# Patient Record
Sex: FEMALE | Race: WHITE | Employment: FULL TIME | URBAN - METROPOLITAN AREA
[De-identification: names, ages, dates, MRNs, and addresses within clinical notes are randomized per-mention and may not be internally consistent; named-entity substitution may affect disease eponyms.]

---

## 2017-04-18 ENCOUNTER — OFFICE VISIT (OUTPATIENT)
Dept: INTERNAL MEDICINE CLINIC | Age: 32
End: 2017-04-18

## 2017-04-18 ENCOUNTER — HOSPITAL ENCOUNTER (OUTPATIENT)
Dept: OTHER | Age: 32
Discharge: HOME | End: 2017-04-18
Attending: INTERNAL MEDICINE | Admitting: INTERNAL MEDICINE

## 2017-04-18 VITALS
TEMPERATURE: 99 F | WEIGHT: 221 LBS | HEIGHT: 64 IN | RESPIRATION RATE: 14 BRPM | HEART RATE: 80 BPM | DIASTOLIC BLOOD PRESSURE: 80 MMHG | SYSTOLIC BLOOD PRESSURE: 110 MMHG | BODY MASS INDEX: 37.73 KG/M2

## 2017-04-18 DIAGNOSIS — B34.9 VIRAL SYNDROME: Primary | ICD-10-CM

## 2017-04-18 LAB
RAPID INFLUENZA  B AGN: NEGATIVE
RAPID INFLUENZA A AGN: NEGATIVE

## 2017-04-18 PROCEDURE — 99213 OFFICE O/P EST LOW 20 MIN: CPT | Performed by: INTERNAL MEDICINE

## 2017-04-18 ASSESSMENT — ENCOUNTER SYMPTOMS
COUGH: 1
SORE THROAT: 1

## 2019-01-18 ENCOUNTER — TELEPHONE (OUTPATIENT)
Dept: INTERNAL MEDICINE CLINIC | Age: 34
End: 2019-01-18

## 2019-01-18 DIAGNOSIS — Z00.00 ROUTINE GENERAL MEDICAL EXAMINATION AT A HEALTH CARE FACILITY: Primary | ICD-10-CM

## 2019-03-02 ENCOUNTER — HOSPITAL ENCOUNTER (OUTPATIENT)
Age: 34
Discharge: HOME | End: 2019-03-02
Payer: COMMERCIAL

## 2019-03-02 DIAGNOSIS — Z00.00 ROUTINE GENERAL MEDICAL EXAMINATION AT A HEALTH CARE FACILITY: ICD-10-CM

## 2019-03-02 LAB
A/G RATIO: 1.3 (ref 1.1–2.2)
ALBUMIN SERPL-MCNC: 3.9 G/DL (ref 3.4–5)
ALP BLD-CCNC: 57 U/L (ref 40–129)
ALT SERPL-CCNC: 22 U/L (ref 10–40)
ANION GAP SERPL CALCULATED.3IONS-SCNC: 13 MMOL/L (ref 3–16)
AST SERPL-CCNC: 17 U/L (ref 15–37)
BASOPHILS ABSOLUTE: 0 K/UL (ref 0–0.2)
BASOPHILS RELATIVE PERCENT: 0.4 %
BILIRUB SERPL-MCNC: <0.2 MG/DL (ref 0–1)
BUN BLDV-MCNC: 11 MG/DL (ref 7–20)
CALCIUM SERPL-MCNC: 8.8 MG/DL (ref 8.3–10.6)
CHLORIDE BLD-SCNC: 102 MMOL/L (ref 99–110)
CO2: 25 MMOL/L (ref 21–32)
CREAT SERPL-MCNC: 0.6 MG/DL (ref 0.6–1.1)
EOSINOPHILS ABSOLUTE: 0.1 K/UL (ref 0–0.6)
EOSINOPHILS RELATIVE PERCENT: 1.5 %
GFR AFRICAN AMERICAN: >60
GFR NON-AFRICAN AMERICAN: >60
GLOBULIN: 2.9 G/DL
GLUCOSE BLD-MCNC: 107 MG/DL (ref 70–99)
HCT VFR BLD CALC: 39.5 % (ref 36–48)
HEMOGLOBIN: 13.1 G/DL (ref 12–16)
LYMPHOCYTES ABSOLUTE: 1.5 K/UL (ref 1–5.1)
LYMPHOCYTES RELATIVE PERCENT: 31.8 %
MCH RBC QN AUTO: 30.6 PG (ref 26–34)
MCHC RBC AUTO-ENTMCNC: 33.1 G/DL (ref 31–36)
MCV RBC AUTO: 92.6 FL (ref 80–100)
MONOCYTES ABSOLUTE: 0.4 K/UL (ref 0–1.3)
MONOCYTES RELATIVE PERCENT: 7.5 %
NEUTROPHILS ABSOLUTE: 2.8 K/UL (ref 1.7–7.7)
NEUTROPHILS RELATIVE PERCENT: 58.8 %
PDW BLD-RTO: 13.5 % (ref 12.4–15.4)
PLATELET # BLD: 287 K/UL (ref 135–450)
PMV BLD AUTO: 8.2 FL (ref 5–10.5)
POTASSIUM SERPL-SCNC: 4.3 MMOL/L (ref 3.5–5.1)
RBC # BLD: 4.27 M/UL (ref 4–5.2)
SODIUM BLD-SCNC: 140 MMOL/L (ref 136–145)
TOTAL PROTEIN: 6.8 G/DL (ref 6.4–8.2)
TSH SERPL DL<=0.05 MIU/L-ACNC: 1.69 UIU/ML (ref 0.27–4.2)
URIC ACID, SERUM: 5 MG/DL (ref 2.6–6)
WBC # BLD: 4.8 K/UL (ref 4–11)

## 2019-03-02 PROCEDURE — 85025 COMPLETE CBC W/AUTO DIFF WBC: CPT

## 2019-03-02 PROCEDURE — 36415 COLL VENOUS BLD VENIPUNCTURE: CPT

## 2019-03-02 PROCEDURE — 80053 COMPREHEN METABOLIC PANEL: CPT

## 2019-03-02 PROCEDURE — 84550 ASSAY OF BLOOD/URIC ACID: CPT

## 2019-03-02 PROCEDURE — 84443 ASSAY THYROID STIM HORMONE: CPT

## 2019-03-07 ENCOUNTER — OFFICE VISIT (OUTPATIENT)
Dept: INTERNAL MEDICINE CLINIC | Age: 34
End: 2019-03-07

## 2019-03-07 VITALS
DIASTOLIC BLOOD PRESSURE: 89 MMHG | BODY MASS INDEX: 40.97 KG/M2 | HEIGHT: 64 IN | RESPIRATION RATE: 18 BRPM | SYSTOLIC BLOOD PRESSURE: 130 MMHG | HEART RATE: 70 BPM | WEIGHT: 240 LBS

## 2019-03-07 DIAGNOSIS — Z00.00 ROUTINE GENERAL MEDICAL EXAMINATION AT A HEALTH CARE FACILITY: Primary | ICD-10-CM

## 2019-03-07 DIAGNOSIS — R73.01 ELEVATED FASTING BLOOD SUGAR: ICD-10-CM

## 2019-03-07 PROCEDURE — 99395 PREV VISIT EST AGE 18-39: CPT | Performed by: INTERNAL MEDICINE

## 2019-03-07 ASSESSMENT — ENCOUNTER SYMPTOMS
SHORTNESS OF BREATH: 0
STRIDOR: 0
PHOTOPHOBIA: 0
DIARRHEA: 0
CONSTIPATION: 0
ABDOMINAL PAIN: 0
TROUBLE SWALLOWING: 0
NAUSEA: 0
EYE PAIN: 0
COLOR CHANGE: 0
CHEST TIGHTNESS: 0
COUGH: 0
SORE THROAT: 0

## 2019-03-07 ASSESSMENT — PATIENT HEALTH QUESTIONNAIRE - PHQ9
SUM OF ALL RESPONSES TO PHQ QUESTIONS 1-9: 0
SUM OF ALL RESPONSES TO PHQ9 QUESTIONS 1 & 2: 0
1. LITTLE INTEREST OR PLEASURE IN DOING THINGS: 0
SUM OF ALL RESPONSES TO PHQ QUESTIONS 1-9: 0
2. FEELING DOWN, DEPRESSED OR HOPELESS: 0

## 2019-03-08 DIAGNOSIS — Z00.00 ROUTINE GENERAL MEDICAL EXAMINATION AT A HEALTH CARE FACILITY: ICD-10-CM

## 2019-03-08 LAB
CHOLESTEROL, FASTING: 179 MG/DL (ref 0–199)
ESTIMATED AVERAGE GLUCOSE: 111.2 MG/DL
HBA1C MFR BLD: 5.5 %
HDLC SERPL-MCNC: 28 MG/DL (ref 40–60)
LDL CHOLESTEROL CALCULATED: 132 MG/DL
TRIGLYCERIDE, FASTING: 94 MG/DL (ref 0–150)
VLDLC SERPL CALC-MCNC: 19 MG/DL

## 2020-10-15 ENCOUNTER — TELEPHONE (OUTPATIENT)
Dept: INTERNAL MEDICINE CLINIC | Age: 35
End: 2020-10-15

## 2020-10-15 NOTE — TELEPHONE ENCOUNTER
----- Message from Jed Montenegro MD sent at 10/15/2020  4:17 PM EDT -----  Contact: Patient [phone]  Order test via [employer] through  ----- Message -----  From: Ralf Hollis  Sent: 10/15/2020   3:28 PM EDT  To: Jed Montenegro MD    Patient was exposed to covid at her place of employment. She went to an urgent care and had the rapid test done, which came back positive. She is leery about the rapid test and is wanting to know if you can order the swab covid test?  Please advise.

## 2020-10-16 ENCOUNTER — OFFICE VISIT (OUTPATIENT)
Dept: PRIMARY CARE CLINIC | Age: 35
End: 2020-10-16
Payer: COMMERCIAL

## 2020-10-16 PROCEDURE — G8421 BMI NOT CALCULATED: HCPCS | Performed by: NURSE PRACTITIONER

## 2020-10-16 PROCEDURE — G8428 CUR MEDS NOT DOCUMENT: HCPCS | Performed by: NURSE PRACTITIONER

## 2020-10-16 PROCEDURE — 99211 OFF/OP EST MAY X REQ PHY/QHP: CPT | Performed by: NURSE PRACTITIONER

## 2020-10-16 NOTE — PATIENT INSTRUCTIONS

## 2020-10-18 LAB — SARS-COV-2, NAA: DETECTED

## 2020-10-19 NOTE — RESULT ENCOUNTER NOTE
Spoke with patient, verbalized understanding. Your test came back detected/positive for Covid-19. What happens if I have a positive test?    If you have symptoms:  Isolate until all three of these things are true: 1) your symptoms are better, 2) it has been 10 days since you first felt sick, and 3) you have had no fever for at least 24 hours without using medicine that lowers fever. Drink plenty of fluids and eat when you can. You may take medicine for pain or fever if you need to. Rest as much as you can. If you do not have symptoms:  Stay home for 10 days after the date you were tested. If you develop symptoms during those 10 days, stay home until all three of these things are true: 1) your symptoms are better, 2) it has been 10 days since you first felt sick, and 3) you have had no fever for at least 24 hours without using medicine that lowers fever. Follow care instructions from your doctor or other healthcare provider. Seek emergency medical care immediately if you have trouble breathing, persistent pain or pressure in the chest, new confusion, inability to wake or stay awake, or bluish lips or face. Someone from the health department (case investigator or contact tracer) may reach out to you to check on your health and ask about other people you have been around or where you've spent time while you may have been able to spread COVID-19 to others. This person's role is strictly to map the virus to help identify people who may have been exposed to the virus and prevent its spread. The local health department will also provide guidance on how to stay safely at home to avoid spreading illness.

## 2021-01-04 ENCOUNTER — TELEPHONE (OUTPATIENT)
Dept: INTERNAL MEDICINE CLINIC | Age: 36
End: 2021-01-04

## 2021-01-04 DIAGNOSIS — R50.9 FEBRILE ILLNESS: Primary | ICD-10-CM

## 2021-01-04 NOTE — TELEPHONE ENCOUNTER
----- Message from Roberta Figueroa sent at 1/4/2021  8:08 AM EST -----  Contact: Pt- [phone]  Pt called stating that she had Covid back in October but thinks she might have it again. Pt started to have a sore throat last night with her ears hurting and a temperature of 99.0. Pt now has a temperature of 102.9 and has vomited this morning.  Pt would like an order for a Covid test faxed to [facility].       [address] [place] [address] [phone]

## 2021-01-04 NOTE — TELEPHONE ENCOUNTER
----- Message from Naomy Horton MD sent at 1/4/2021  1:29 PM EST -----  Contact: [phone]  Please do  ----- Message -----  From: Caterina Renee  Sent: 1/4/2021  10:24 AM EST  To: Naomy Horton MD    Pt needs an order for a rapid test so she can let her employer know asa send to [facility].

## 2021-01-06 ENCOUNTER — HOSPITAL ENCOUNTER (OUTPATIENT)
Age: 36
Discharge: HOME | End: 2021-01-06
Payer: COMMERCIAL

## 2021-01-06 ENCOUNTER — TELEPHONE (OUTPATIENT)
Dept: INTERNAL MEDICINE CLINIC | Age: 36
End: 2021-01-06

## 2021-01-06 ENCOUNTER — HOSPITAL ENCOUNTER (OUTPATIENT)
Dept: GENERAL RADIOLOGY | Age: 36
Discharge: HOME | End: 2021-01-06
Payer: COMMERCIAL

## 2021-01-06 DIAGNOSIS — R68.89 FLU-LIKE SYMPTOMS: ICD-10-CM

## 2021-01-06 DIAGNOSIS — R50.9 FEVER, UNSPECIFIED FEVER CAUSE: Primary | ICD-10-CM

## 2021-01-06 DIAGNOSIS — R50.9 FEVER, UNSPECIFIED FEVER CAUSE: ICD-10-CM

## 2021-01-06 LAB
RAPID INFLUENZA  B AGN: NEGATIVE
RAPID INFLUENZA A AGN: NEGATIVE

## 2021-01-06 PROCEDURE — 87804 INFLUENZA ASSAY W/OPTIC: CPT

## 2021-01-06 PROCEDURE — 71046 X-RAY EXAM CHEST 2 VIEWS: CPT

## 2021-01-07 ENCOUNTER — TELEPHONE (OUTPATIENT)
Dept: INTERNAL MEDICINE CLINIC | Age: 36
End: 2021-01-07

## 2021-01-07 DIAGNOSIS — J18.9 PNEUMONIA OF BOTH LOWER LOBES DUE TO INFECTIOUS ORGANISM: Primary | ICD-10-CM

## 2021-01-07 RX ORDER — CEFDINIR 300 MG/1
300 CAPSULE ORAL 2 TIMES DAILY
Qty: 14 CAPSULE | Refills: 0 | Status: SHIPPED | OUTPATIENT
Start: 2021-01-07 | End: 2021-01-14

## 2021-01-07 RX ORDER — AZITHROMYCIN 250 MG/1
TABLET, FILM COATED ORAL
Qty: 1 PACKET | Refills: 0 | Status: SHIPPED | OUTPATIENT
Start: 2021-01-07 | End: 2021-06-21 | Stop reason: ALTCHOICE

## 2021-01-07 RX ORDER — BENZONATATE 100 MG/1
100 CAPSULE ORAL 3 TIMES DAILY PRN
Qty: 30 CAPSULE | Refills: 0 | Status: SHIPPED | OUTPATIENT
Start: 2021-01-07 | End: 2021-01-17

## 2021-01-07 NOTE — TELEPHONE ENCOUNTER
----- Message from Rosy Eagle sent at 1/7/2021  2:06 PM EST -----  Contact: Marcella Dugan [phone] option [phone]  Pharmacy wanting to verify that you want patient on [facility] and Waneta Escort?

## 2021-01-07 NOTE — TELEPHONE ENCOUNTER
----- Message from Nicole Zuniga MD sent at 1/7/2021  8:05 AM EST -----  Bilateral lower lobe pneumonia noted  Start on levaquin 500 mg daily x 7 days  Need repeat cxr in 4 weeks    Likely caused from covid with secondary bacterial infection     Tessalon pearls 100 mg tid prn cough #30  zofran 4 mg ODT prn nausea q8hrly     Call her

## 2021-06-18 ENCOUNTER — TELEPHONE (OUTPATIENT)
Dept: INTERNAL MEDICINE CLINIC | Age: 36
End: 2021-06-18

## 2021-06-18 NOTE — TELEPHONE ENCOUNTER
----- Message from Licha Dec sent at 6/18/2021  9:55 AM EDT -----  Contact: [place] Merlin [phone]  Left message for patient to return call. Patient can see Dr Ronald Han Monday at 4:20  ----- Message -----  From: Tonio Gonzalez MD  Sent: 6/18/2021   9:43 AM EDT  To: Shauna Concepcion appt next week  ----- Message -----  From: Glen Sexton  Sent: 6/18/2021   9:30 AM EDT  To: Tonio Gonzalez MD    Patient suspects she has sleep apnea. She would like to be seen and discuss a sleep study with you. Has family history of sleep apnea with mother and sister.  Thank you

## 2021-06-18 NOTE — TELEPHONE ENCOUNTER
----- Message from Vito Delgadillo MD sent at 6/18/2021  9:43 AM EDT -----  Contact: Truong Cuevas [phone]  Make appt next week  ----- Message -----  From: Aliza Kate  Sent: 6/18/2021   9:30 AM EDT  To: Vito Delgadillo MD    Patient suspects she has sleep apnea. She would like to be seen and discuss a sleep study with you. Has family history of sleep apnea with mother and sister.  Thank you

## 2021-06-21 ENCOUNTER — OFFICE VISIT (OUTPATIENT)
Dept: INTERNAL MEDICINE CLINIC | Age: 36
End: 2021-06-21

## 2021-06-21 VITALS
RESPIRATION RATE: 18 BRPM | HEART RATE: 70 BPM | BODY MASS INDEX: 43.19 KG/M2 | WEIGHT: 253 LBS | SYSTOLIC BLOOD PRESSURE: 140 MMHG | HEIGHT: 64 IN | DIASTOLIC BLOOD PRESSURE: 78 MMHG

## 2021-06-21 DIAGNOSIS — Z00.00 ANNUAL PHYSICAL EXAM: ICD-10-CM

## 2021-06-21 DIAGNOSIS — G47.33 OSA (OBSTRUCTIVE SLEEP APNEA): ICD-10-CM

## 2021-06-21 DIAGNOSIS — Z00.00 ANNUAL PHYSICAL EXAM: Primary | ICD-10-CM

## 2021-06-21 DIAGNOSIS — E66.01 CLASS 3 SEVERE OBESITY DUE TO EXCESS CALORIES WITHOUT SERIOUS COMORBIDITY WITH BODY MASS INDEX (BMI) OF 40.0 TO 44.9 IN ADULT (HCC): ICD-10-CM

## 2021-06-21 LAB
BILIRUBIN, POC: NORMAL
BLOOD URINE, POC: NORMAL
CLARITY, POC: NORMAL
COLOR, POC: NORMAL
GLUCOSE URINE, POC: NORMAL
KETONES, POC: NORMAL
LEUKOCYTE EST, POC: NORMAL
NITRITE, POC: NORMAL
PH, POC: NORMAL
PROTEIN, POC: NORMAL
SPECIFIC GRAVITY, POC: NORMAL
UROBILINOGEN, POC: NORMAL

## 2021-06-21 PROCEDURE — 81002 URINALYSIS NONAUTO W/O SCOPE: CPT | Performed by: INTERNAL MEDICINE

## 2021-06-21 PROCEDURE — 99395 PREV VISIT EST AGE 18-39: CPT | Performed by: INTERNAL MEDICINE

## 2021-06-21 ASSESSMENT — ENCOUNTER SYMPTOMS
CHEST TIGHTNESS: 0
DIARRHEA: 0
COLOR CHANGE: 0
EYE PAIN: 0
STRIDOR: 0
ABDOMINAL PAIN: 0
CONSTIPATION: 0
COUGH: 0
TROUBLE SWALLOWING: 0
PHOTOPHOBIA: 0
SHORTNESS OF BREATH: 0
SORE THROAT: 0
NAUSEA: 0

## 2021-06-21 NOTE — PROGRESS NOTES
Subjective:      Patient ID: Gus Kim is a 28 y.o. female. HPI      28 y.o. female here for annual exam       No medical issues except for obesity and recent weight gain   , does not take any meds  No heart or lung issues    Reports excessive snoring and possible apneic spells at night , family got concerned about her sleep. Pt denies any daytime fatigue or excessive sleepiness . Energy levels good  Mother has severe MARITO     Recently had covid infection and recovered      and has 2 kids  Non smoker, no alcohol use    Mother with ovarian cancer, father with hx of DM     Past Medical History:   Diagnosis Date    COVID-19 10/16/2020        Past Surgical History:   Procedure Laterality Date    ABDOMEN SURGERY      age 12 yrs   Leeann Errol ADENOIDECTOMY      APPENDECTOMY       SECTION      x2    TONSILLECTOMY      age   Leeann Errol WISDOM TOOTH EXTRACTION Bilateral     five years ago     Allergies   Allergen Reactions    Ciprofloxacin Swelling    Bactrim [Sulfamethoxazole-Trimethoprim] Swelling and Rash    Sulfa Antibiotics Swelling and Rash       Review of Systems   Constitutional: Negative for activity change, diaphoresis, fever and unexpected weight change. HENT: Negative for congestion, ear discharge, hearing loss, nosebleeds, sore throat, tinnitus and trouble swallowing. Eyes: Negative for photophobia, pain and visual disturbance. Respiratory: Negative for cough, chest tightness, shortness of breath and stridor. Cardiovascular: Negative for chest pain and palpitations. Gastrointestinal: Negative for abdominal pain, constipation, diarrhea and nausea. Endocrine: Negative for cold intolerance, heat intolerance and polyuria. Genitourinary: Negative for dysuria, flank pain, frequency and hematuria. Musculoskeletal: Negative for arthralgias and gait problem. Skin: Negative for color change and rash. Allergic/Immunologic: Negative for immunocompromised state.    Neurological: Negative for tremors, seizures, weakness, numbness and headaches. Psychiatric/Behavioral: Positive for sleep disturbance. Negative for decreased concentration. The patient is not nervous/anxious. All other systems reviewed and are negative. Objective:   Physical Exam     Vitals:    06/21/21 1621   BP: (!) 140/78   Pulse: 70   Resp: 18         General: young female, obese,  Awake, alert and oriented. Appears to be not in any distress  Mucous Membranes:  Pink , anicteric  Short neck   Neck: No JVD, no carotid bruit, no thyromegaly  Chest:  Clear to auscultation bilaterally, no added sounds  Cardiovascular:  RRR S1S2 heard, no murmurs or gallops  Abdomen:  Soft, obese, undistended, non tender, no organomegaly, BS present  Extremities: No edema or cyanosis. Distal pulses well felt  Neurological : grossly normal  Non focal     Lab Results   Component Value Date    WBC 4.8 03/02/2019    HGB 13.1 03/02/2019    HCT 39.5 03/02/2019    MCV 92.6 03/02/2019     03/02/2019     Lab Results   Component Value Date     03/02/2019    K 4.3 03/02/2019     03/02/2019    CO2 25 03/02/2019    BUN 11 03/02/2019    CREATININE 0.6 03/02/2019    GLUCOSE 107 (H) 03/02/2019    CALCIUM 8.8 03/02/2019    PROT 6.8 03/02/2019    LABALBU 3.9 03/02/2019    BILITOT <0.2 03/02/2019    ALKPHOS 57 03/02/2019    AST 17 03/02/2019    ALT 22 03/02/2019    LABGLOM >60 03/02/2019    GFRAA >60 03/02/2019    AGRATIO 1.3 03/02/2019    GLOB 2.9 03/02/2019     Wt Readings from Last 3 Encounters:   06/21/21 253 lb (114.8 kg)   03/07/19 240 lb (108.9 kg)   05/27/17 217 lb (98.4 kg)         Assessment:       Diagnosis Orders   1. Annual physical exam  HEMOGLOBIN A1C    CBC WITH AUTO DIFFERENTIAL    COMPREHENSIVE METABOLIC PANEL    TSH with Reflex    LIPID PANEL    URIC ACID    POCT Urinalysis no Micro    Rashmi Mae MD, Sleep Medicine, [facility]   2. MARITO (obstructive sleep apnea)     3.  Class 3 severe obesity due to excess calories without serious comorbidity with body mass index (BMI) of 40.0 to 44.9 in adult ([facility] [address]  HEMOGLOBIN A1C           Plan:      Annual exam done     Suspected MARITO - obtain sleep study      Elevated BP but no HTN, has h.o gestational HTN - should monitor at work  Low salt diet    Recent labs reviewed  Healthy diet and weight loss discussed    Does see GYN yearly  Had flu vaccine  Had covid vaccine this year                Emiliana Nunez MD

## 2021-06-22 LAB
A/G RATIO: 1.4 (ref 1.1–2.2)
ALBUMIN SERPL-MCNC: 4.3 G/DL (ref 3.4–5)
ALP BLD-CCNC: 70 U/L (ref 40–129)
ALT SERPL-CCNC: 17 U/L (ref 10–40)
ANION GAP SERPL CALCULATED.3IONS-SCNC: 12 MMOL/L (ref 3–16)
AST SERPL-CCNC: 27 U/L (ref 15–37)
BASOPHILS ABSOLUTE: 0.1 K/UL (ref 0–0.2)
BASOPHILS RELATIVE PERCENT: 0.8 %
BILIRUB SERPL-MCNC: <0.2 MG/DL (ref 0–1)
BUN BLDV-MCNC: 11 MG/DL (ref 7–20)
CALCIUM SERPL-MCNC: 9.3 MG/DL (ref 8.3–10.6)
CHLORIDE BLD-SCNC: 104 MMOL/L (ref 99–110)
CHOLESTEROL, TOTAL: 189 MG/DL (ref 0–199)
CO2: 23 MMOL/L (ref 21–32)
CREAT SERPL-MCNC: 0.7 MG/DL (ref 0.6–1.1)
EOSINOPHILS ABSOLUTE: 0.1 K/UL (ref 0–0.6)
EOSINOPHILS RELATIVE PERCENT: 1 %
ESTIMATED AVERAGE GLUCOSE: 108.3 MG/DL
GFR AFRICAN AMERICAN: >60
GFR NON-AFRICAN AMERICAN: >60
GLOBULIN: 3 G/DL
GLUCOSE BLD-MCNC: 90 MG/DL (ref 70–99)
HBA1C MFR BLD: 5.4 %
HCT VFR BLD CALC: 37 % (ref 36–48)
HDLC SERPL-MCNC: 26 MG/DL (ref 40–60)
HEMOGLOBIN: 12.3 G/DL (ref 12–16)
LDL CHOLESTEROL CALCULATED: 107 MG/DL
LYMPHOCYTES ABSOLUTE: 2.6 K/UL (ref 1–5.1)
LYMPHOCYTES RELATIVE PERCENT: 33.5 %
MCH RBC QN AUTO: 30.1 PG (ref 26–34)
MCHC RBC AUTO-ENTMCNC: 33.2 G/DL (ref 31–36)
MCV RBC AUTO: 90.6 FL (ref 80–100)
MONOCYTES ABSOLUTE: 0.6 K/UL (ref 0–1.3)
MONOCYTES RELATIVE PERCENT: 7.3 %
NEUTROPHILS ABSOLUTE: 4.4 K/UL (ref 1.7–7.7)
NEUTROPHILS RELATIVE PERCENT: 57.4 %
PDW BLD-RTO: 14.9 % (ref 12.4–15.4)
PLATELET # BLD: 278 K/UL (ref 135–450)
PMV BLD AUTO: 8.4 FL (ref 5–10.5)
POTASSIUM SERPL-SCNC: 4.3 MMOL/L (ref 3.5–5.1)
RBC # BLD: 4.08 M/UL (ref 4–5.2)
SODIUM BLD-SCNC: 139 MMOL/L (ref 136–145)
TOTAL PROTEIN: 7.3 G/DL (ref 6.4–8.2)
TRIGL SERPL-MCNC: 278 MG/DL (ref 0–150)
TSH REFLEX: 2.04 UIU/ML (ref 0.27–4.2)
URIC ACID, SERUM: 5.8 MG/DL (ref 2.6–6)
VLDLC SERPL CALC-MCNC: 56 MG/DL
WBC # BLD: 7.7 K/UL (ref 4–11)

## 2021-07-06 DIAGNOSIS — G47.30 SLEEP APNEA, UNSPECIFIED TYPE: Primary | ICD-10-CM

## 2021-07-12 ENCOUNTER — OFFICE VISIT (OUTPATIENT)
Dept: PULMONOLOGY | Age: 36
End: 2021-07-12
Payer: COMMERCIAL

## 2021-07-12 VITALS
WEIGHT: 247.6 LBS | HEART RATE: 93 BPM | TEMPERATURE: 95 F | DIASTOLIC BLOOD PRESSURE: 78 MMHG | OXYGEN SATURATION: 98 % | HEIGHT: 64 IN | BODY MASS INDEX: 42.27 KG/M2 | SYSTOLIC BLOOD PRESSURE: 118 MMHG

## 2021-07-12 DIAGNOSIS — G47.10 HYPERSOMNIA: ICD-10-CM

## 2021-07-12 DIAGNOSIS — Z78.9 NONSMOKER: ICD-10-CM

## 2021-07-12 DIAGNOSIS — R06.83 SNORING: ICD-10-CM

## 2021-07-12 DIAGNOSIS — R53.83 FATIGUE, UNSPECIFIED TYPE: ICD-10-CM

## 2021-07-12 DIAGNOSIS — G47.30 OBSERVED SLEEP APNEA: ICD-10-CM

## 2021-07-12 DIAGNOSIS — R93.89 ABNORMAL CHEST X-RAY: Primary | ICD-10-CM

## 2021-07-12 PROCEDURE — 99204 OFFICE O/P NEW MOD 45 MIN: CPT | Performed by: INTERNAL MEDICINE

## 2021-07-12 ASSESSMENT — SLEEP AND FATIGUE QUESTIONNAIRES
HOW LIKELY ARE YOU TO NOD OFF OR FALL ASLEEP WHILE SITTING QUIETLY AFTER LUNCH WITHOUT ALCOHOL: 0
HOW LIKELY ARE YOU TO NOD OFF OR FALL ASLEEP WHILE SITTING AND READING: 1
HOW LIKELY ARE YOU TO NOD OFF OR FALL ASLEEP WHILE SITTING AND TALKING TO SOMEONE: 0
NECK CIRCUMFERENCE (INCHES): 17
HOW LIKELY ARE YOU TO NOD OFF OR FALL ASLEEP IN A CAR, WHILE STOPPED FOR A FEW MINUTES IN TRAFFIC: 0
ESS TOTAL SCORE: 5
HOW LIKELY ARE YOU TO NOD OFF OR FALL ASLEEP WHILE WATCHING TV: 3
HOW LIKELY ARE YOU TO NOD OFF OR FALL ASLEEP WHEN YOU ARE A PASSENGER IN A CAR FOR AN HOUR WITHOUT A BREAK: 1
HOW LIKELY ARE YOU TO NOD OFF OR FALL ASLEEP WHILE SITTING INACTIVE IN A PUBLIC PLACE: 0
HOW LIKELY ARE YOU TO NOD OFF OR FALL ASLEEP WHILE LYING DOWN TO REST IN THE AFTERNOON WHEN CIRCUMSTANCES PERMIT: 0

## 2021-07-12 NOTE — PATIENT INSTRUCTIONS
Remember to bring all pulmonary medications to your next appointment with the office. Please keep all of your future appointments scheduled by [facility] [address] Pul[facility] office. Out of respect for other patients and providers, you may be asked to reschedule your appointment if you arrive later than your scheduled appointment time. Appointments cancelled less than 24hrs in advance will be considered a no show. Patients with three missed appointments within 1 year or four missed appointments within 2 years can be dismissed from the practice. Your home sleep test is scheduled to be picked up at the Sleep center located at [address] [place] [address] up on  at  . Address to Sleep Center: [facility] at [address] [place] [address], [address]            Phone: [phone] Fax: [phone]    If you should need to cancel or reschedule your appointment, please call the Sleep Center at [phone] as soon as possible. We ask that you please phone the [phone] [facility] Patient Pre-Services ([phone]) at least 3-5 days prior to your sleep study to pre-register. Failing to pre-register may ultimately cause your insurance to not pay for this procedure. Please refrain from or reduce the use of caffeine and/or alcohol prior to your sleep study and avoid napping the day of your study as these will affect the accuracy of your test results. Sleep Hygiene. .. Tips for better sleep. .. Avoid naps. This will ensure you are sleepy at bedtime. If you have to take a nap, sleep less than 1 hour, before 3 pm.  Sleep only when sleepy; this reduces the time you are awake in bed. Regular exercise is recommended to help you deepen your sleep, but not within 4-6 hours of your bedtime. Timing of exercise is important, aim to exercise early in the morning or early afternoon. A light snack may help you fall asleep.  Warm milk and foods high in the amino acid tryptophan, such as bananas, may help you to sleep  Be sure to avoid heavy, spicy or sugary foods 4-6 hours before bedtime and avoid at snack time. Stay away from stimulants such as caffeine and nicotine for at least 4-6 hours before bed. Stimulants can interfere with your ability to fall asleep. Caffeine is found in tea, cola, coffee, cocoa and chocolate and is best avoided at bedtime. Nicotine is found in tobacco products. Avoid alcohol 4-6 hours before bedtime. Alcohol has an immediate sleep-inducing effect, after a few hours when alcohol levels fall there is a stimulant or wake-up effect and will cause fragmented sleep. Sleep rituals are important. Give your body clues it is time to slow down and sleep. Examples include; yoga, deep breathing, listen to relaxing music, a hot bath or a few minutes of reading. Have a fixed bedtime and awakening time, Even on weekends! You will feel better keeping a regular sleep cycle, even if you are retired or not working. Get into your favorite sleep position. If not asleep in 30 minutes, get up and do something boring until you feel sleepy. Remember not to expose yourself to bright lights such as TV, phone or tablet screens. Only use your bed for sleeping. Do not use your bed as an office, workroom or recreation room. Use comfortable bedding. Uncomfortable bedding can prevent good sleep. Ensure your bedroom is quiet and comfortable. A cooler room along with enough blankets to stay warm is recommended. If your room is too noisy, try a white noise machine. If too bright, try black out shades or an eye mask. Dont take worries to bed. Leave worries about work, school etc. behind you when you go to bed. Some people find it helpful to assign a worry period in the evening or late afternoon to write down your worries and get them out of your system.

## 2021-07-12 NOTE — PROGRESS NOTES
[facility]                                                                  CHIEF COMPLAINT: Sleep apnea evaluation    Consulting provider: Gage Herrera MD      HPI:   Patient wears Fitbit which has been shown her variation in her O2 sat at night suggestive of irregular sleep. Snoring at night for the past few years- worse lately. The severity of snoring is severe. Worse in supine position and better on the side. Has observed sleep apnea. No restorative sleep. + dry mouth upon awakening. Patient is complaining of daytime sleepiness, fatigue and tiredness at times during the day. Bedtime 10 pm and rise time is 6:45 am. Sleep onset 10-15 minutes. 0 nocturia. No nap during the day. No headache in am. No car wrecks or near wrecks because of the sleepiness. No nodding off while driving. Past Medical History:   Diagnosis Date    COVID-19 10/16/2020       Past Surgical History:        Procedure Laterality Date    ABDOMEN SURGERY      age 12 yrs   [de-identified] ADENOIDECTOMY      APPENDECTOMY       SECTION      x2    TONSILLECTOMY      age   [de-identified] WISDOM TOOTH EXTRACTION Bilateral     five years ago       Allergies:  is allergic to ciprofloxacin, bactrim [sulfamethoxazole-trimethoprim], and sulfa antibiotics. Social History:    TOBACCO:   reports that she has never smoked. She has never used smokeless tobacco.  ETOH:   reports current alcohol use.       Family History:       Problem Relation Age of Onset    Diabetes Mother     High Cholesterol Mother     High Blood Pressure Mother     Diabetes Father     High Blood Pressure Father     High Cholesterol Father     High Blood Pressure Maternal Grandmother     High Cholesterol Maternal Grandmother     Cancer Maternal Grandfather     High Blood Pressure Maternal Grandfather     High Cholesterol Maternal Grandfather     Cancer Paternal Grandmother     Cancer Paternal Grandfather     Cancer Sister        Current Medications:  No current outpatient medications on file. REVIEW OF SYSTEMS:  Constitutional: Negative for fever  HENT: Negative for sore throat  Eyes: Negative for redness   Respiratory: Negative for dyspnea, cough  Cardiovascular: Negative for chest pain  Gastrointestinal: Negative for vomiting, diarrhea   Genitourinary: Negative for hematuria   Musculoskeletal: Negative for arthralgias   Skin: Negative for rash  Neurological: Negative for syncope  Hematological: Negative for adenopathy  Psychiatric/Behavorial: Negative for anxiety      Objective:   PHYSICAL EXAM:    Height 5' 4\" (1.626 m), weight 247 lb 9.6 oz (112.3 kg), not currently breastfeeding.' on RA  Gen: No distress. Obese. BMI of 42.50  Eyes: PERRL. No sclera icterus. No conjunctival injection. ENT: No discharge. Pharynx clear. Mallampati class IV. Neck: Trachea midline. No obvious mass. Neck circumference 17\"  Resp: No accessory muscle use. No crackles. No wheezes. No rhonchi. No dullness on percussion. Good air entry. CV: Regular rate. Regular rhythm. No murmur or rub. No edema. GI: Non-tender. Non-distended. No hernia. Skin: Warm and dry. No nodule on exposed extremities. Lymph: No cervical LAD. No supraclavicular LAD. M/S: No cyanosis. No joint deformity. No clubbing. Neuro: Awake. Alert. Moves all four extremities. Psych: Oriented x 3. No anxiety. DATA reviewed by me:   Chest x-ray 1/6/2021 imaging reviewed by me and showed  Multifocal airspace disease    Assessment:       · Snoring  · Observed sleep apnea   · Hypersomnia   · Fatigue   · Abnormal chest x-ray Jan 2021 - treated for PNA   · Life long nonsmoker       Plan:     HST evaluate for sleep related breathing disorder. Treatment options were discussed with patient if HST reveals MARITO, including CPAP therapy, oral appliances, upper airway surgery and hypoglossal nerve stimulation.    APAP min pressure of 8 and max pressure of 16 cmH2O if positive HST   Discussed with patient the pathophysiology of apnea. Sleep hygiene  Avoid sedatives, alcohol and caffeinated drinks at bed time. No driving motorized vehicles or operating heavy machinery while fatigue, drowsy or sleepy. Weight loss is also recommended as a long-term intervention. Complications of MARITO if not treated were discussed with patient patient to include systemic hypertension, pulmonary hypertension, cardiovascular morbidities, car accidents and all cause mortality.   CXR PA and lateral to document resolution of abnormalities

## 2021-07-28 ENCOUNTER — HOSPITAL ENCOUNTER (OUTPATIENT)
Dept: SLEEP CENTER | Age: 36
Discharge: HOME | End: 2021-07-30
Payer: COMMERCIAL

## 2021-07-28 DIAGNOSIS — G47.30 OBSERVED SLEEP APNEA: ICD-10-CM

## 2021-07-28 DIAGNOSIS — G47.10 HYPERSOMNIA: ICD-10-CM

## 2021-07-28 DIAGNOSIS — R06.83 SNORING: ICD-10-CM

## 2021-07-28 DIAGNOSIS — R53.83 FATIGUE, UNSPECIFIED TYPE: ICD-10-CM

## 2021-07-28 PROCEDURE — 95806 SLEEP STUDY UNATT&RESP EFFT: CPT

## 2021-07-30 PROCEDURE — 95806 SLEEP STUDY UNATT&RESP EFFT: CPT | Performed by: INTERNAL MEDICINE

## 2021-08-03 DIAGNOSIS — G47.33 OSA (OBSTRUCTIVE SLEEP APNEA): Primary | ICD-10-CM

## 2021-12-09 ENCOUNTER — NURSE ONLY (OUTPATIENT)
Dept: INTERNAL MEDICINE CLINIC | Age: 36
End: 2021-12-09

## 2021-12-09 DIAGNOSIS — Z23 NEED FOR INFLUENZA VACCINATION: Primary | ICD-10-CM

## 2021-12-09 PROCEDURE — 90682 RIV4 VACC RECOMBINANT DNA IM: CPT | Performed by: INTERNAL MEDICINE

## 2021-12-09 PROCEDURE — 90471 IMMUNIZATION ADMIN: CPT | Performed by: INTERNAL MEDICINE

## 2021-12-29 ENCOUNTER — TELEPHONE (OUTPATIENT)
Dept: PULMONOLOGY | Age: 36
End: 2021-12-29

## 2021-12-29 ENCOUNTER — OFFICE VISIT (OUTPATIENT)
Dept: PULMONOLOGY | Age: 36
End: 2021-12-29
Payer: COMMERCIAL

## 2021-12-29 VITALS
WEIGHT: 250 LBS | DIASTOLIC BLOOD PRESSURE: 86 MMHG | HEART RATE: 92 BPM | OXYGEN SATURATION: 98 % | BODY MASS INDEX: 42.91 KG/M2 | SYSTOLIC BLOOD PRESSURE: 138 MMHG

## 2021-12-29 DIAGNOSIS — R93.89 ABNORMAL CHEST X-RAY: ICD-10-CM

## 2021-12-29 DIAGNOSIS — E66.01 MORBID OBESITY (HCC): ICD-10-CM

## 2021-12-29 DIAGNOSIS — G47.10 HYPERSOMNIA: ICD-10-CM

## 2021-12-29 DIAGNOSIS — Z78.9 NONSMOKER: ICD-10-CM

## 2021-12-29 DIAGNOSIS — G47.30 OBSERVED SLEEP APNEA: ICD-10-CM

## 2021-12-29 DIAGNOSIS — G47.33 MODERATE OBSTRUCTIVE SLEEP APNEA: Primary | ICD-10-CM

## 2021-12-29 PROCEDURE — 99214 OFFICE O/P EST MOD 30 MIN: CPT | Performed by: INTERNAL MEDICINE

## 2021-12-29 ASSESSMENT — SLEEP AND FATIGUE QUESTIONNAIRES
HOW LIKELY ARE YOU TO NOD OFF OR FALL ASLEEP WHILE LYING DOWN TO REST IN THE AFTERNOON WHEN CIRCUMSTANCES PERMIT: 0
HOW LIKELY ARE YOU TO NOD OFF OR FALL ASLEEP WHILE WATCHING TV: 1
HOW LIKELY ARE YOU TO NOD OFF OR FALL ASLEEP IN A CAR, WHILE STOPPED FOR A FEW MINUTES IN TRAFFIC: 0
HOW LIKELY ARE YOU TO NOD OFF OR FALL ASLEEP WHILE SITTING AND TALKING TO SOMEONE: 0
HOW LIKELY ARE YOU TO NOD OFF OR FALL ASLEEP WHEN YOU ARE A PASSENGER IN A CAR FOR AN HOUR WITHOUT A BREAK: 1
ESS TOTAL SCORE: 2
HOW LIKELY ARE YOU TO NOD OFF OR FALL ASLEEP WHILE SITTING INACTIVE IN A PUBLIC PLACE: 0
HOW LIKELY ARE YOU TO NOD OFF OR FALL ASLEEP WHILE SITTING AND READING: 0
HOW LIKELY ARE YOU TO NOD OFF OR FALL ASLEEP WHILE SITTING QUIETLY AFTER LUNCH WITHOUT ALCOHOL: 0

## 2021-12-29 NOTE — PATIENT INSTRUCTIONS
Remember to bring all pulmonary medications to your next appointment with the office. Please keep all of your future appointments scheduled by [facility] Pul[facility] office. Out of respect for other patients and providers, you may be asked to reschedule your appointment if you arrive later than your scheduled appointment time. Appointments cancelled less than 24hrs in advance will be considered a no show. Patients with three missed appointments within 1 year or four missed appointments within 2 years can be dismissed from the practice. Sleep Hygiene. .. Tips for better sleep. .. Avoid naps. This will ensure you are sleepy at bedtime. If you have to take a nap, sleep less than 1 hour, before 3 pm.  Sleep only when sleepy; this reduces the time you are awake in bed. Regular exercise is recommended to help you deepen your sleep, but not within 4-6 hours of your bedtime. Timing of exercise is important, aim to exercise early in the morning or early afternoon. A light snack may help you fall asleep. Warm milk and foods high in the amino acid tryptophan, such as bananas, may help you to sleep  Be sure to avoid heavy, spicy or sugary foods 4-6 hours before bedtime and avoid at snack time. Stay away from stimulants such as caffeine and nicotine for at least 4-6 hours before bed. Stimulants can interfere with your ability to fall asleep. Caffeine is found in tea, cola, coffee, cocoa and chocolate and is best avoided at bedtime. Nicotine is found in tobacco products. Avoid alcohol 4-6 hours before bedtime. Alcohol has an immediate sleep-inducing effect, after a few hours when alcohol levels fall there is a stimulant or wake-up effect and will cause fragmented sleep. Sleep rituals are important. Give your body clues it is time to slow down and sleep. Examples include; yoga, deep breathing, listen to relaxing music, a hot bath or a few minutes of reading.   Have a fixed bedtime and awakening time, Even on weekends! You will feel better keeping a regular sleep cycle, even if you are retired or not working. Get into your favorite sleep position. If not asleep in 30 minutes, get up and do something boring until you feel sleepy. Remember not to expose yourself to bright lights such as TV, phone or tablet screens. Only use your bed for sleeping. Do not use your bed as an office, workroom or recreation room. Use comfortable bedding. Uncomfortable bedding can prevent good sleep. Ensure your bedroom is quiet and comfortable. A cooler room along with enough blankets to stay warm is recommended. If your room is too noisy, try a white noise machine. If too bright, try black out shades or an eye mask. Dont take worries to bed. Leave worries about work, school etc. behind you when you go to bed. Some people find it helpful to assign a worry period in the evening or late afternoon to write down your worries and get them out of your system. CPAP Equipment Cleaning and Disinfecting Schedule  Equipment Cleaning Frequency Instructions  Disinfecting Frequency   Non-Disposable Filters  Weekly Mild soapy water, Rinse, Air Dry Not Required   Disposable Filters Change as needed  2-4 weeks Do Not Wash Not Required   Hose/tubing Daily Mild soapy water, Rinse, Air Dry Once a week   Mask / Nasal Pillows Daily Mild soapy water, Rinse, Air Dry Once a week   Headgear Weekly Hand wash, Mild soapy water, Rinse, Dry  Not Required   Humidifier Daily Empty water daily  Mild soapy water, Rinse well, Air Dry  Once a week   CPAP Unit As Needed Dust with damp cloth,  No detergents or sprays Not Required         Disinfect (per schedule) with 1 part white vinegar and 3 parts water- soak mask and water chamber for 30 minutes every 1-2 weeks, more often if sick. Allow water/vinegar mixture to run through tubing. Allow all equipment to air dry.    Drying Hints:   Always hang tubing away from direct sunlight, as this will cause the tubing to become yellow, brittle and crack over a period of time. DO NOT attach the wet tubing to your CPAP unit to blow-dry it. The moisture from the tubing can drain back into your machine. Moisture in your unit can cause sudden pressure increases or short circuits  DO's and DON'Ts:  - Don't use alcohol-based products to clean your mask, because it can cause the materials to become hard and brittle. - Don't put headgear in the washer or dryer  - Don't use any caustic or household cleaning solutions such as bleach on your CPAP   equipment.  - Do follow the recommended cleaning schedule. - Do change your disposable filter frequently. Adapted From: [employer]/cleaning. shtm.   These are general suggestions for all models please follow specific s recommendations and specific instructions

## 2021-12-29 NOTE — PROGRESS NOTES
[facility]                                                                  CHIEF COMPLAINT: follow up MARITO         HPI:   HST was reviewed by me and noted below. Results were dicussed with patient and multiple good questions were answered. Started CPAP and sleeps more sound and feels better. Got used to sleeping with it. Patient is using CPAP 7-8 hrs/night. Using humidifier. No snoring on CPAP. The pressure is well tolerated. The mask is comfortable. No mask leak. No significant daytime sleepiness. No nodding off when driving. Bedtime is 10-11 pm and rise time is 7 am. Sleep onset is 10-15 minutes. ESS is 2. Past Medical History:   Diagnosis Date    COVID-19 10/16/2020       Past Surgical History:        Procedure Laterality Date    ABDOMEN SURGERY      age 12 yrs   [facility] ADENOIDECTOMY      APPENDECTOMY       SECTION      x2    TONSILLECTOMY      age   [facility] WISDOM TOOTH EXTRACTION Bilateral     five years ago       Allergies:  is allergic to ciprofloxacin, bactrim [sulfamethoxazole-trimethoprim], and sulfa antibiotics. Social History:    TOBACCO:   reports that she has never smoked. She has never used smokeless tobacco.  ETOH:   reports current alcohol use. Family History:       Problem Relation Age of Onset    Diabetes Mother     High Cholesterol Mother     High Blood Pressure Mother     Diabetes Father     High Blood Pressure Father     High Cholesterol Father     High Blood Pressure Maternal Grandmother     High Cholesterol Maternal Grandmother     Cancer Maternal Grandfather     High Blood Pressure Maternal Grandfather     High Cholesterol Maternal Grandfather     Cancer Paternal Grandmother     Cancer Paternal Grandfather     Cancer Sister        Current Medications:  No current outpatient medications on file.           Objective:   PHYSICAL EXAM:    Blood pressure 138/86, pulse 92, weight 250 lb (113.4 kg), last menstrual period 12/22/2021, SpO2 98 %, not currently breastfeeding.' on RA  Gen: No distress. Obese. BMI of 42.91  Eyes: PERRL. No sclera icterus. No conjunctival injection. ENT: No discharge. Pharynx clear. Mallampati class IV. Neck: Trachea midline. No obvious mass. Neck circumference 15.5\"  Resp: No accessory muscle use. No crackles. No wheezes. No rhonchi. No dullness on percussion. Good air entry. CV: Regular rate. Regular rhythm. No murmur or rub. No edema. GI: Non-tender. Non-distended. No hernia. Skin: Warm and dry. No nodule on exposed extremities. Lymph: No cervical LAD. No supraclavicular LAD. M/S: No cyanosis. No joint deformity. No clubbing. Neuro: Awake. Alert. Moves all four extremities. Psych: Oriented x 3. No anxiety. DATA reviewed by me:   Chest x-ray 1/6/2021 imaging reviewed by me and showed  Multifocal airspace disease    HST 7/28/21 AHI 15.2     CPAP data 11/29-12/28 2021 reviewed by me. Uses 7-8  hrs/night with 93% compliance and AHI of  0.3. Median pressure of 8.9 cmH2O, 95th pressure of  10.9 cmH2O. APAP 8-16 cmH2O     Assessment:       · Moderate MARITO. APAP 8-16 cmH2O. Nasal pillow. Optimal compliance and efficacy upon review today. · Observed sleep apnea and Hypersomnia - better now   · Morbid obesity class III  · Abnormal chest x-ray Jan 2021 - treated for PNA   · Life long nonsmoker       Plan:    Change APAP 8-12 cmH2O   Order for CPAP supplies  Continue CPAP 6-8 hrs at night and during naps. Replacement of mask, tubing, head straps every 3-6 months or sooner if damaged. Follow up CPAP compliance and pressure adjustment if needed  Sleep hygiene  Refer to [facility]) [facility]- Dr. Clover Landaverde   Avoid sedatives, alcohol and caffeinated drinks at bed time. No driving motorized vehicles or operating heavy machinery while fatigue, drowsy or sleepy. Weight loss is also recommended as a long-term intervention.     Advised to schedule CXR PA and lateral to document resolution of abnormalities  Follow up in 1 year or sooner if needed

## 2021-12-29 NOTE — TELEPHONE ENCOUNTER
Order faxed for pressure change to [facility]. Will call to confirm pressure has been changed    Assessment: 12/29/21      · Moderate MARITO. APAP 8-16 cmH2O. Nasal pillow. Optimal compliance and efficacy upon review today. · Observed sleep apnea and Hypersomnia - better now   · Abnormal chest x-ray Jan 2021 - treated for PNA   · Life long nonsmoker       Plan:    · Change APAP 8-12 cmH2O   · Order for CPAP supplies  · Continue CPAP 6-8 hrs at night and during naps. · Replacement of mask, tubing, head straps every 3-6 months or sooner if damaged. · Follow up CPAP compliance and pressure adjustment if needed  · Sleep hygiene  · Refer to [facility]) [facility]- Dr. Raya Comes   · Avoid sedatives, alcohol and caffeinated drinks at bed time. · No driving motorized vehicles or operating heavy machinery while fatigue, drowsy or sleepy. · Weight loss is also recommended as a long-term intervention.     · CXR PA and lateral to document resolution of abnormalities  · Follow up in 1 year or sooner if needed

## 2022-01-21 ENCOUNTER — PATIENT MESSAGE (OUTPATIENT)
Dept: PULMONOLOGY | Age: 37
End: 2022-01-21

## 2022-01-21 ENCOUNTER — TELEPHONE (OUTPATIENT)
Dept: PULMONOLOGY | Age: 37
End: 2022-01-21

## 2022-01-21 DIAGNOSIS — G47.33 MODERATE OBSTRUCTIVE SLEEP APNEA: Primary | ICD-10-CM

## 2022-01-21 NOTE — TELEPHONE ENCOUNTER
CPAP Pressures    Joao Lopez MD 2 minutes ago (10:15 AM)     HR      At my last appointment you made some adjustments to my pressures and ever since I keep waking up after about 5-6 hours because the pressure is VERY strong and then I cant get comfortable again to fall back asleep. So I end up taking the mask off and falling asleep without it, which I know isnt good but I need to sleep. Is there anyway you can reset my settings to what they were before? I was sleeping without any issues and I just feel like I havent been since the pressures were changed. Thank you so much!!     Compliance scanned for review. OV 12/29/21:    Assessment:       · Moderate MARITO. APAP 8-16 cmH2O. Nasal pillow. Optimal compliance and efficacy upon review today. · Observed sleep apnea and Hypersomnia - better now   · Morbid obesity class III  · Abnormal chest x-ray Jan 2021 - treated for PNA   · Life long nonsmoker       Plan:    · Change APAP 8-12 cmH2O   · Order for CPAP supplies  · Continue CPAP 6-8 hrs at night and during naps. · Replacement of mask, tubing, head straps every 3-6 months or sooner if damaged. · Follow up CPAP compliance and pressure adjustment if needed  · Sleep hygiene  · Refer to [facility]) [facility]- Dr. Darin Murphy   · Avoid sedatives, alcohol and caffeinated drinks at bed time. · No driving motorized vehicles or operating heavy machinery while fatigue, drowsy or sleepy. · Weight loss is also recommended as a long-term intervention.     · Advised to schedule CXR PA and lateral to document resolution of abnormalities  · Follow up in 1 year or sooner if needed

## 2022-01-25 NOTE — TELEPHONE ENCOUNTER
Deonna Beck MD 22 hours ago (10:07 AM)     HR      I slept fine Friday night but Saturday night I could hardly tolerate it and last night I woke up after about 6 hours.

## 2022-01-26 NOTE — TELEPHONE ENCOUNTER
Pt returned call stating that she doesn't feel the need to schedule an appt at this time, she will continue to try to get adjusted to machine and call if she needs to schedule.

## 2022-01-31 ENCOUNTER — OFFICE VISIT (OUTPATIENT)
Dept: BARIATRICS/WEIGHT MGMT | Age: 37
End: 2022-01-31

## 2022-01-31 VITALS
BODY MASS INDEX: 42.85 KG/M2 | SYSTOLIC BLOOD PRESSURE: 114 MMHG | HEART RATE: 91 BPM | WEIGHT: 251 LBS | DIASTOLIC BLOOD PRESSURE: 70 MMHG | HEIGHT: 64 IN

## 2022-01-31 DIAGNOSIS — Z01.818 PREOPERATIVE CLEARANCE: ICD-10-CM

## 2022-01-31 PROCEDURE — 99999 PR OFFICE/OUTPT VISIT,PROCEDURE ONLY: CPT

## 2022-01-31 NOTE — PROGRESS NOTES
Dick Alcantara is a 39 y.o. female with a date of birth of 1985. Vitals:    01/31/22 1100   BP: 114/70   Pulse: 91   Weight: 251 lb (113.9 kg)   Height: 5' 4\" (1.626 m)    BMI: Body mass index is 43.08 kg/m². Obesity Classification: Class III    Weight History: Wt Readings from Last 3 Encounters:   01/31/22 251 lb (113.9 kg)   12/29/21 250 lb (113.4 kg)   07/12/21 247 lb 9.6 oz (112.3 kg)       Patient's lowest adult weight was 190 lb at age 22. Patient's highest adult weight was 250 lb at age, current. Feels she has battled the scale her whole life, had two pregnancies and lost the excess weight, then weight kept creeping up. Patient has participated in the following weight loss programs: Weight [employer], calorie restriction, low carb diet, advocare and . Patient has participated in meal replacement/liquid diets. Patient has not participated in weight loss medications. Patient is not lactose intolerant. Patient does not have Nondenominational/cultural food concerns. Patient does not have food allergies - not a big fan of sea food. 24 hour recall/food frequency chart:  Breakfast: yes. Sometimes when she has time - cereal OR sausage and biscuit OR poptart  Snack: yes. Cheese stick  Lunch: yes. Leftovers and yogurt - activia  Snack: no.   Dinner: yes. Depends on what is planned. Goal is to get in a meat and vegetable OR chicken nuggets or cheeseburger or chicken sandwich  Snack: yes. Something sweet  Drinks throughout the day: sweet tea 3-4x/week, pop occasionally, water  Do you drink alcohol? yes. How often/how much alcohol do you drink: 3-4 Mixed drinks per month. Patient does not meet the criteria for binge eating disorder. Patient does have grazing - sometimes at work and in the evening. Patient does have night eating - sometimes before bed - tries not to eat after 9:30pm. Patient does have a history of emotional eating or eating out of boredom. Goals  Weight: 180-190#  Health Improvement: Improve sleep apnea    Assessment  Nutritional Needs: RMR=(9.99 x 113.9) + (6.25 x 162.6) - (4.92 x 36 y.o.) -161= 1816 kcal x 1.4 (light activity factor)= 2542 kcal - 1000 (for 2 lb weight loss/week)= 1542 kcal.    Plan  Plan/Recommendations: General weight loss/lifestyle modification strategies discussed (elicit support from others; identify saboteurs; non-food rewards, etc). Diet interventions: 1500kcal/LC. Pt needs quick easy breakfasts - protein bar, HB egg, protein shake  Optifast:  May be interested - provided samples  Diet Medications: May be interested    PES Statement:  Overweight/Obesity related to increased caloric intake and decreased physical activity as evidenced by BMI. Body mass index is 43.08 kg/m². Will follow up as necessary.     Rosie Ritter RD, LD

## 2022-02-05 ENCOUNTER — TELEMEDICINE (OUTPATIENT)
Dept: BARIATRICS/WEIGHT MGMT | Age: 37
End: 2022-02-05
Payer: COMMERCIAL

## 2022-02-05 DIAGNOSIS — G47.33 OBSTRUCTIVE SLEEP APNEA: ICD-10-CM

## 2022-02-05 DIAGNOSIS — Z71.3 DIETARY COUNSELING AND SURVEILLANCE: ICD-10-CM

## 2022-02-05 DIAGNOSIS — E66.01 MORBID OBESITY WITH BMI OF 40.0-44.9, ADULT (HCC): Primary | ICD-10-CM

## 2022-02-05 PROCEDURE — 99203 OFFICE O/P NEW LOW 30 MIN: CPT | Performed by: FAMILY MEDICINE

## 2022-02-05 ASSESSMENT — ENCOUNTER SYMPTOMS
CHEST TIGHTNESS: 0
ABDOMINAL PAIN: 0
WHEEZING: 0
NAUSEA: 0
DIARRHEA: 0
VOMITING: 0
COUGH: 0
APNEA: 0
SHORTNESS OF BREATH: 0
CONSTIPATION: 0
BLOOD IN STOOL: 0
EYE PAIN: 0
ABDOMINAL DISTENTION: 0
PHOTOPHOBIA: 0
CHOKING: 0

## 2022-02-05 NOTE — PROGRESS NOTES
Patient: Mariama Kearney     Encounter Date: 2/5/2022    YOB: 1985               Age: 39 y.o. Patient identification was verified at the start of the visit. Patient-Reported Vitals 2/5/2022   Patient-Reported Weight 250   Patient-Reported Height 54   Patient-Reported Systolic 281   Patient-Reported Diastolic 78   Patient-Reported Pulse 98   Patient-Reported Temperature 98.2         BP Readings from Last 1 Encounters:   01/31/22 114/70       BMI Readings from Last 1 Encounters:   01/31/22 43.08 kg/m²       Pulse Readings from Last 1 Encounters:   01/31/22 91                                              Wt Readings from Last 3 Encounters:   01/31/22 251 lb (113.9 kg)   12/29/21 250 lb (113.4 kg)   07/12/21 247 lb 9.6 oz (112.3 kg)       Chief Complaint   Patient presents with    Bariatric, Initial Visit     GAVIN- NP       HPI:    39 y.o. female presents to establish care via video visit. The patient's medical history is significant for class III obesity. The patient has a long-standing history of obesity which started gradually. The problem is severe. The patient has been gaining weight. Risk factors include annual weight gain of >2 lbs (1 kg)/ year and sedentary lifestyle. Aggravating factors include poor diet and lack of physical activity. The patient has tried various diet/exercise plans which have been ineffective in the long-run. She is motivated to start losing weight to help improve her overall health including her sleep apnea. When did you become overweight? [] Childhood   [] Teens   [x] Adulthood   [] Pregnancy   [] Menopause    Highest adult weight: 250 pounds (current)     Triggers for weight gain?    [] Stress   [] Illness   [] Medications   [] Travel  []Injury     [] Nightshift work   [] Insomnia  [x] No specific triggers   [] Other    Food triggers:   [x] Stress   [x] Boredom   [x] Fast food   [x] Eating out   [] Seeking reward   [] Social     Have you ever taken medications to help you lose weight? [] Yes  [x] No    Have you ever been on a meal replacement program?  [] Yes  [x] No    How many hours of sleep do you get each night?  [] <6 hours  [x] 6-8 hours  [] >8 hours    Do you have sleep apnea? [x] Yes  [] No   [] Unknown     Do you have frequent awakenings, difficulty falling asleep, feeling fatigued, unrefreshed sleep, daytime sleepiness? [] Yes  [x] No     The patient denies any significant cardiac or psychiatric disease. The patient denies a history thyroid disease. The patient denies a history of glaucoma. The patient denies a history of seizure disorders/epiliepsy. Dietitian's assessment reviewed and addressed with patient. Reviewed:  [x] Nutrition and the importance of regular protein intake  [x] Hidden CHO/carbohydrate sources  [x] Alcohol use  [x] Tobacco use  [x] Drug use- Denies   [x] Importance of exercise and reducing sedentary time  [x] PHQ-2      Controlled Substance Monitoring:     No flowsheet data found. Allergies   Allergen Reactions    Ciprofloxacin Swelling    Bactrim [Sulfamethoxazole-Trimethoprim] Swelling and Rash    Sulfa Antibiotics Swelling and Rash       No current outpatient medications on file.     Patient Active Problem List   Diagnosis    Gestational hypertension    Increased BMI    Snoring    Observed sleep apnea    Hypersomnia    Fatigue    Preoperative clearance       Past Medical History:   Diagnosis Date    COVID-19 10/16/2020       Past Surgical History:   Procedure Laterality Date    ABDOMEN SURGERY      age 12 yrs   [facility] ADENOIDECT[facility]      APPENDECTOMY       SECTION      x2    TONSILLECTOMY      age   [facility] WISDOM TOOTH EXTRACTION Bilateral     five years ago       Family History   Problem Relation Age of Onset    Diabetes Mother     Hypertension Mother    [facility] Elevated Lipids Mother    [facility] Migraines Mother     Kidney Disease Mother     Diabetes Father     Hypertension Father     Elevated Lipids Father     Migraines Father     Depression Father     Hypertension Maternal Grandmother     Elevated Lipids Maternal Grandmother     Arthritis Maternal Grandmother     Cancer Maternal Grandfather     Hypertension Maternal Grandfather     Elevated Lipids Maternal Grandfather     Cancer Paternal Grandmother     Cancer Paternal Grandfather     Cancer Sister        Review of Systems   Constitutional: Negative for fatigue. Eyes: Negative for photophobia, pain and visual disturbance. Respiratory: Negative for apnea, cough, choking, chest tightness, shortness of breath and wheezing. Cardiovascular: Negative for chest pain, palpitations and leg swelling. Gastrointestinal: Negative for abdominal distention, abdominal pain, blood in stool, constipation, diarrhea, nausea and vomiting. Endocrine: Negative for cold intolerance and heat intolerance. Musculoskeletal: Negative for arthralgias and myalgias. Skin: Negative for rash. Neurological: Negative for dizziness, tremors, syncope, weakness, numbness and headaches. Psychiatric/Behavioral: Negative for agitation, confusion, decreased concentration, dysphoric mood, hallucinations, sleep disturbance and suicidal ideas. The patient is not nervous/anxious and is not hyperactive. Physical Exam  Constitutional:       Appearance: She is well-developed. HENT:      Head: Normocephalic. Eyes:      Conjunctiva/sclera: Conjunctivae normal.   Abdominal:      General: Abdomen is protuberant. Musculoskeletal:         General: No swelling. Neurological:      Mental Status: She is alert and oriented to person, place, and time. Psychiatric:         Mood and Affect: Mood normal.         Behavior: Behavior normal.         Thought Content:  Thought content normal.         Judgment: Judgment normal.         Orders Only on 06/21/2021   Component Date Value Ref Range Status    Uric Acid, Serum 06/21/2021 5.8  2.6 - 6.0 mg/dL Final    Cholesterol, Total 06/21/2021 189  0 - 199 mg/dL Final    Triglycerides 06/21/2021 278* 0 - 150 mg/dL Final    HDL 06/21/2021 26* 40 - 60 mg/dL Final    LDL Calculated 06/21/2021 107* <100 mg/dL Final    VLDL Cholesterol Calculated 06/21/2021 56  Not Established mg/dL Final    TSH 06/21/2021 2.04  0.27 - 4.20 uIU/mL Final    Sodium 06/21/2021 139  136 - 145 mmol/L Final    Potassium 06/21/2021 4.3  3.5 - 5.1 mmol/L Final    Chloride 06/21/2021 104  99 - 110 mmol/L Final    CO2 06/21/2021 23  21 - 32 mmol/L Final    Anion Gap 06/21/2021 12  3 - 16 Final    Glucose 06/21/2021 90  70 - 99 mg/dL Final    BUN 06/21/2021 11  7 - 20 mg/dL Final    CREATININE 06/21/2021 0.7  0.6 - 1.1 mg/dL Final    GFR Non- 06/21/2021 >60  >60 Final    Comment: >60 mL/min/1.73m2 EGFR, calc. for ages 25 and older using the  MDRD formula (not corrected for weight), is valid for stable  renal function.  GFR  06/21/2021 >60  >60 Final    Comment: Chronic Kidney Disease: less than 60 ml/min/1.73 sq.m. Kidney Failure: less than 15 ml/min/1.73 sq.m. Results valid for patients 18 years and older.       Calcium 06/21/2021 9.3  8.3 - 10.6 mg/dL Final    Total Protein 06/21/2021 7.3  6.4 - 8.2 g/dL Final    Albumin 06/21/2021 4.3  3.4 - 5.0 g/dL Final    Albumin/Globulin Ratio 06/21/2021 1.4  1.1 - 2.2 Final    Total Bilirubin 06/21/2021 <0.2  0.0 - 1.0 mg/dL Final    Alkaline Phosphatase 06/21/2021 70  40 - 129 U/L Final    ALT 06/21/2021 17  10 - 40 U/L Final    AST 06/21/2021 27  15 - 37 U/L Final    Globulin 06/21/2021 3.0  g/dL Final    WBC 06/21/2021 7.7  4.0 - 11.0 K/uL Final    RBC 06/21/2021 4.08  4.00 - 5.20 M/uL Final    Hemoglobin 06/21/2021 12.3  12.0 - 16.0 g/dL Final    Hematocrit 06/21/2021 37.0  36.0 - 48.0 % Final    MCV 06/21/2021 90.6  80.0 - 100.0 fL Final    MCH 06/21/2021 30.1  26.0 - 34.0 pg Final    MCHC 06/21/2021 33.2  31.0 - 36.0 g/dL Final    RDW 06/21/2021 14.9  12.4 - 15.4 % Final    Platelets 14/91/0000 278  135 - 450 K/uL Final    MPV 06/21/2021 8.4  5.0 - 10.5 fL Final    Neutrophils % 06/21/2021 57.4  % Final    Lymphocytes % 06/21/2021 33.5  % Final    Monocytes % 06/21/2021 7.3  % Final    Eosinophils % 06/21/2021 1.0  % Final    Basophils % 06/21/2021 0.8  % Final    Neutrophils Absolute 06/21/2021 4.4  1.7 - 7.7 K/uL Final    Lymphocytes Absolute 06/21/2021 2.6  1.0 - 5.1 K/uL Final    Monocytes Absolute 06/21/2021 0.6  0.0 - 1.3 K/uL Final    Eosinophils Absolute 06/21/2021 0.1  0.0 - 0.6 K/uL Final    Basophils Absolute 06/21/2021 0.1  0.0 - 0.2 K/uL Final    Hemoglobin A1C 06/21/2021 5.4  See comment % Final    Comment: Comment:  Diagnosis of Diabetes: > or = 6.5%  Increased risk of diabetes (Prediabetes): 5.7-6.4%  Glycemic Control: Nonpregnant Adults: <7.0%                    Pregnant: <6.0%        eAG 06/21/2021 108.3  mg/dL Final         Assessment and Plan:    1. Morbid obesity with BMI of 40.0-44.9, adult ([facility] [address]  Heavily counseled on the importance of therapeutic lifestyle changes through diet and exercise. The patient understands that the goal of treatment is to reach and stay at a healthy weight. The initial treatment goal is to lose at least 5-10% of her body weight in 12 weeks. This will require changes in eating habits, increased physical activity, and behavior changes. Counseled on low carb/megha diet. Patient handouts and education material provided and reviewed in detail with the patient. All questions answered. Encouraged patient to keep a food journal and to bring it to her next visit. Discussed available treatment options in addition to lifestyle changes including medications or OPTIFAST. She is most interested in lifestyle management only. Depending on the patient's success with these changes, she may also be a good candidate for bariatric surgery down the line. Follow-up in 2 weeks to discuss lab results and treatment plan. Patient advised that it is her responsibility to follow up on any ordered tests/lab results. Patient understands and agrees with the plan. HP access provided to start exercise program.    2. Dietary counseling and surveillance  [address]/low carb meal plan. 3. Obstructive sleep apnea  Counseled on the importance of sleep in weight management and vice versa. Explained that a 5-10% weight loss can help improve MARITO,         Nutrition:  [] LCHF/Ketogenic [x] Low carb/low-calorie diet [] Low-calorie diet     []Maintenance        FITTE:   [x] Cardio [] Resistance/stength exercises   [x] ACSM recommendations (150 minutes/week)           Behavior:   [x] Motivational interviewing performed    [] Referral for counseling  [x] Discussed strategies to overcome habits/challenges for focus      [x] Stress management   [x] Stimulus control  [x] Sleep hygiene      No orders of the defined types were placed in this encounter. No follow-ups on file. Patricia Myrick is a 39 y.o. female being evaluated by a Virtual Visit (video visit) encounter to address concerns as mentioned above. A caregiver was present when appropriate. Due to this being a TeleHealth encounter (During [facility]- public health emergency), evaluation of the following organ systems was limited: Vitals/Constitutional/EENT/Resp/CV/GI//MS/Neuro/Skin/Heme-Lymph-Imm. Pursuant to the emergency declaration under the [facility], [address] [place] [address] authority and the [employer] and [employer] Act, this Virtual Visit was conducted with patient's (and/or legal guardian's) consent, to reduce the patient's risk of exposure to COVID-19 and provide necessary medical care. The patient (and/or legal guardian) has also been advised to contact this office for worsening conditions or problems, and seek emergency medical treatment and/or call 911 if deemed necessary.        Services were provided through a video synchronous discussion virtually to substitute for in-person clinic visit. Patient and provider were located at their individual homes. --Leif Smith MD on 2/5/2022 at 10:32 AM    An electronic signature was used to authenticate this note.

## 2022-03-02 PROBLEM — Z01.818 PREOPERATIVE CLEARANCE: Status: RESOLVED | Noted: 2022-01-31 | Resolved: 2022-03-02

## 2022-03-22 VITALS — HEIGHT: 64 IN | BODY MASS INDEX: 40.41 KG/M2 | WEIGHT: 236.7 LBS

## 2022-03-26 ENCOUNTER — TELEMEDICINE (OUTPATIENT)
Dept: BARIATRICS/WEIGHT MGMT | Age: 37
End: 2022-03-26
Payer: COMMERCIAL

## 2022-03-26 DIAGNOSIS — Z71.3 DIETARY COUNSELING AND SURVEILLANCE: ICD-10-CM

## 2022-03-26 DIAGNOSIS — E66.01 MORBID OBESITY WITH BMI OF 40.0-44.9, ADULT (HCC): Primary | ICD-10-CM

## 2022-03-26 PROCEDURE — 99213 OFFICE O/P EST LOW 20 MIN: CPT | Performed by: FAMILY MEDICINE

## 2022-03-26 ASSESSMENT — ENCOUNTER SYMPTOMS
WHEEZING: 0
SHORTNESS OF BREATH: 0
COUGH: 0
CHOKING: 0
DIARRHEA: 0
APNEA: 0
NAUSEA: 0
VOMITING: 0
PHOTOPHOBIA: 0
ABDOMINAL DISTENTION: 0
CHEST TIGHTNESS: 0
CONSTIPATION: 0
ABDOMINAL PAIN: 0
BLOOD IN STOOL: 0
EYE PAIN: 0

## 2022-03-26 NOTE — PROGRESS NOTES
Patient: Hung Ferrer                      Encounter Date: 3/26/2022    YOB: 1985               Age: 39 y.o. Chief Complaint   Patient presents with    Weight Management     F/u MWM       Patient identification was verified at the start of the visit. Patient-Reported Vitals 3/22/2022   Patient-Reported Weight 236.7   Patient-Reported Height 54   Patient-Reported Systolic -   Patient-Reported Diastolic -   Patient-Reported Pulse 83   Patient-Reported Temperature -         BP Readings from Last 1 Encounters:   01/31/22 114/70       BMI Readings from Last 1 Encounters:   03/22/22 40.63 kg/m²       Pulse Readings from Last 1 Encounters:   01/31/22 91            HPI: 39 y.o. female with a long-standing history of obesity presents today for a virtual video follow-up. She has lost 14 pounds since her last visit. Current treatment includes low carb/megha diet and exercise. Food recall and assessment reviewed. Making better dietary choices. Happy with weight loss. Motivated to continue losing weight. Diet: []LCHF/Ketogenic [x]Modified low-calorie/low carb diet  []Low-calorie diet          []Maintenance       []Other:         Adherent? [x]Yes     []No           Allergies   Allergen Reactions    Ciprofloxacin Swelling    Bactrim [Sulfamethoxazole-Trimethoprim] Swelling and Rash    Sulfa Antibiotics Swelling and Rash       No current outpatient medications on file. Patient Active Problem List   Diagnosis    Gestational hypertension    Increased BMI    Snoring    Observed sleep apnea    Hypersomnia    Fatigue       Review of Systems   Constitutional: Negative for fatigue. Eyes: Negative for photophobia, pain and visual disturbance. Respiratory: Negative for apnea, cough, choking, chest tightness, shortness of breath and wheezing. Cardiovascular: Negative for chest pain, palpitations and leg swelling.    Gastrointestinal: Negative for abdominal distention, abdominal pain, blood in stool, constipation, diarrhea, nausea and vomiting. Endocrine: Negative for cold intolerance and heat intolerance. Musculoskeletal: Negative for arthralgias and myalgias. Skin: Negative for rash. Neurological: Negative for dizziness, tremors, syncope, weakness, numbness and headaches. Psychiatric/Behavioral: Negative for agitation, confusion, decreased concentration, dysphoric mood, hallucinations, sleep disturbance and suicidal ideas. The patient is not nervous/anxious and is not hyperactive. Physical Exam  Constitutional:       Appearance: She is well-developed. HENT:      Head: Normocephalic. Eyes:      Conjunctiva/sclera: Conjunctivae normal.   Abdominal:      General: Abdomen is protuberant. Musculoskeletal:         General: No swelling. Neurological:      Mental Status: She is alert and oriented to person, place, and time. Psychiatric:         Mood and Affect: Mood normal.         Behavior: Behavior normal.         Thought Content:  Thought content normal.         Judgment: Judgment normal.         Orders Only on 06/21/2021   Component Date Value Ref Range Status    Uric Acid, Serum 06/21/2021 5.8  2.6 - 6.0 mg/dL Final    Cholesterol, Total 06/21/2021 189  0 - 199 mg/dL Final    Triglycerides 06/21/2021 278* 0 - 150 mg/dL Final    HDL 06/21/2021 26* 40 - 60 mg/dL Final    LDL Calculated 06/21/2021 107* <100 mg/dL Final    VLDL Cholesterol Calculated 06/21/2021 56  Not Established mg/dL Final    TSH 06/21/2021 2.04  0.27 - 4.20 uIU/mL Final    Sodium 06/21/2021 139  136 - 145 mmol/L Final    Potassium 06/21/2021 4.3  3.5 - 5.1 mmol/L Final    Chloride 06/21/2021 104  99 - 110 mmol/L Final    CO2 06/21/2021 23  21 - 32 mmol/L Final    Anion Gap 06/21/2021 12  3 - 16 Final    Glucose 06/21/2021 90  70 - 99 mg/dL Final    BUN 06/21/2021 11  7 - 20 mg/dL Final    CREATININE 06/21/2021 0.7  0.6 - 1.1 mg/dL Final    GFR Non- 06/21/2021 >60  >60 Final Comment: >60 mL/min/1.73m2 EGFR, calc. for ages 25 and older using the  MDRD formula (not corrected for weight), is valid for stable  renal function.  GFR  06/21/2021 >60  >60 Final    Comment: Chronic Kidney Disease: less than 60 ml/min/1.73 sq.m. Kidney Failure: less than 15 ml/min/1.73 sq.m. Results valid for patients 18 years and older.       Calcium 06/21/2021 9.3  8.3 - 10.6 mg/dL Final    Total Protein 06/21/2021 7.3  6.4 - 8.2 g/dL Final    Albumin 06/21/2021 4.3  3.4 - 5.0 g/dL Final    Albumin/Globulin Ratio 06/21/2021 1.4  1.1 - 2.2 Final    Total Bilirubin 06/21/2021 <0.2  0.0 - 1.0 mg/dL Final    Alkaline Phosphatase 06/21/2021 70  40 - 129 U/L Final    ALT 06/21/2021 17  10 - 40 U/L Final    AST 06/21/2021 27  15 - 37 U/L Final    Globulin 06/21/2021 3.0  g/dL Final    WBC 06/21/2021 7.7  4.0 - 11.0 K/uL Final    RBC 06/21/2021 4.08  4.00 - 5.20 M/uL Final    Hemoglobin 06/21/2021 12.3  12.0 - 16.0 g/dL Final    Hematocrit 06/21/2021 37.0  36.0 - 48.0 % Final    MCV 06/21/2021 90.6  80.0 - 100.0 fL Final    MCH 06/21/2021 30.1  26.0 - 34.0 pg Final    MCHC 06/21/2021 33.2  31.0 - 36.0 g/dL Final    RDW 06/21/2021 14.9  12.4 - 15.4 % Final    Platelets 97/26/7480 278  135 - 450 K/uL Final    MPV 06/21/2021 8.4  5.0 - 10.5 fL Final    Neutrophils % 06/21/2021 57.4  % Final    Lymphocytes % 06/21/2021 33.5  % Final    Monocytes % 06/21/2021 7.3  % Final    Eosinophils % 06/21/2021 1.0  % Final    Basophils % 06/21/2021 0.8  % Final    Neutrophils Absolute 06/21/2021 4.4  1.7 - 7.7 K/uL Final    Lymphocytes Absolute 06/21/2021 2.6  1.0 - 5.1 K/uL Final    Monocytes Absolute 06/21/2021 0.6  0.0 - 1.3 K/uL Final    Eosinophils Absolute 06/21/2021 0.1  0.0 - 0.6 K/uL Final    Basophils Absolute 06/21/2021 0.1  0.0 - 0.2 K/uL Final    Hemoglobin A1C 06/21/2021 5.4  See comment % Final    Comment: Comment:  Diagnosis of Diabetes: > or = 6.5%  Increased risk of diabetes (Prediabetes): 5.7-6.4%  Glycemic Control: Nonpregnant Adults: <7.0%                    Pregnant: <6.0%        eAG 06/21/2021 108.3  mg/dL Final         Assessment and Plan:  1. Morbid obesity with BMI of 40.0-44.9, adult ([address]  Improving. Continue current management. Reinforced general low carb guidelines. Increase exercise. F/u 4 weeks. 2. Dietary counseling and surveillance  Low carb/megha meal plan. Nutrition plan: [] LCHF/Ketogenic   [x] Modified low-calorie diet (low carb/low-megha)               [] Low-calorie diet    []Maintenance       []Other    Exercise: [x]Cardio     [x]Resistance/strength training                       [x]ACSM recommendations (150 minutes/week in active weight loss)                              Behavior: [x]Motivational interviewing performed    [] Referral for counseling                         [x] Discussed strategies to overcome habits/challenges for focus         [] Stress management   [x] Stimulus control                    [] Sleep hygiene    Reviewed:  [x] Nutrition and the importance of regularprotein intake  [x] Hidden carbohydrate sources  [x] Alcohol use  [x] Tobacco use   [x] Importance of exercise and reducing sedentary time      Total weight loss: 14 pounds       No orders of the defined types were placed in this encounter. No follow-ups on file. Fide Mendez is a 39 y.o. female being evaluated by a Virtual Visit (video visit) encounter to address concerns as mentioned above. A caregiver was present when appropriate. Due to this being a TeleHealth encounter (During [place] [address] public health emergency), evaluation of the following organ systems was limited: Vitals/Constitutional/EENT/Resp/CV/GI//MS/Neuro/Skin/Heme-Lymph-Imm.   Pursuant to the emergency declaration under the [facility], [address] [place] [address] authority and the [employer] and [employer] General Act, this Virtual Visit was conducted with patient's (and/or legal guardian's) consent, to reduce the patient's risk of exposure to COVID-19 and provide necessary medical care. The patient (and/or legal guardian) has also been advised to contact this office for worsening conditions or problems, and seek emergency medical treatment and/or call 911 if deemed necessary. Services were provided through a video synchronous discussion virtually to substitute for in-person clinic visit. Patient and provider were located at their individual homes. --Leif Smith MD on 3/26/2022 at 3:09 PM    An electronic signature was used to authenticate this note.

## 2022-07-05 ENCOUNTER — TELEPHONE (OUTPATIENT)
Dept: PULMONOLOGY | Age: 37
End: 2022-07-05

## 2022-07-05 DIAGNOSIS — G47.33 MODERATE OBSTRUCTIVE SLEEP APNEA: Primary | ICD-10-CM

## 2022-07-05 NOTE — TELEPHONE ENCOUNTER
Guru Askew MD 2 days ago     DAYAMI Quevedo lost 23 pounds since I last saw you and Im finding my CPAP machine much more intolerant. How do we know if my numbers need adjustments? I think the weight loss is helping my sleep apnea perhaps! Any suggestions are appreciated! Have a great 4th! CR scanned. 12/29/21    Assessment:       · Moderate MARITO. APAP 8-16 cmH2O. Nasal pillow. Optimal compliance and efficacy upon review today. · Observed sleep apnea and Hypersomnia - better now   · Morbid obesity class III  · Abnormal chest x-ray Jan 2021 - treated for PNA   · Life long nonsmoker       Plan:    · Change APAP 8-12 cmH2O   · Order for CPAP supplies  · Continue CPAP 6-8 hrs at night and during naps. · Replacement of mask, tubing, head straps every 3-6 months or sooner if damaged. · Follow up CPAP compliance and pressure adjustment if needed  · Sleep hygiene  · Refer to [facility]- Dr. Carlton Salamanca   · Avoid sedatives, alcohol and caffeinated drinks at bed time. · No driving motorized vehicles or operating heavy machinery while fatigue, drowsy or sleepy. · Weight loss is also recommended as a long-term intervention.     · Advised to schedule CXR PA and lateral to document resolution of abnormalities  · Follow up in 1 year or sooner if needed

## 2022-07-15 ENCOUNTER — TELEPHONE (OUTPATIENT)
Dept: INTERNAL MEDICINE CLINIC | Age: 37
End: 2022-07-15

## 2022-07-15 NOTE — TELEPHONE ENCOUNTER
----- Message from Hannah Fried MD sent at 7/15/2022  1:40 PM EDT -----  Contact: [facility]   [phone]  200 mg tid prn # 21   ----- Message -----  From: Rubia Grossman  Sent: 7/15/2022   1:24 PM EDT  To: MD Dr. Rain Jarquin pt- please advise.  ----- Message -----  From: Rubia Grossman  Sent: 7/15/2022   9:23 AM EDT  To: Stefanie Costa MD    Tessalon 100 mg or 200 mg?  ----- Message -----  From: Stefanie Costa MD  Sent: 7/15/2022   9:18 AM EDT  To: Rubia Grossman    Pt does not have significant risk factors   Does not qualify for med  Monitor symptoms and sats   Tessalon for cough    ----- Message -----  From: Aleksandr Brandon  Sent: 7/15/2022   8:16 AM EDT  To: Stefanie Costa MD    Patient called and states that she tested positive this morning for Covid. Patient states did a home test. Patient would like to have the new medication for it.       Patient stated [facility] [id number] [facility]

## 2022-10-07 ENCOUNTER — TELEPHONE (OUTPATIENT)
Dept: INTERNAL MEDICINE CLINIC | Age: 37
End: 2022-10-07

## 2022-10-07 ENCOUNTER — PATIENT MESSAGE (OUTPATIENT)
Dept: INTERNAL MEDICINE CLINIC | Age: 37
End: 2022-10-07

## 2022-10-07 DIAGNOSIS — Z00.00 ROUTINE GENERAL MEDICAL EXAMINATION AT A HEALTH CARE FACILITY: Primary | ICD-10-CM

## 2022-10-07 NOTE — TELEPHONE ENCOUNTER
----- Message from Jimenez Cervantes MD sent at 10/7/2022 11:02 AM EDT -----  Cbc cmp, tsh, lipids, uric acid, ua    ----- Message -----  From: Malcolm Benítez  Sent: 10/7/2022  10:16 AM EDT  To: Jimenez Cervantes MD    Patient would like to get her labwork tomorrow morning before her appointment next week. What would you like ordered?

## 2022-10-07 NOTE — TELEPHONE ENCOUNTER
From: Quan Finn  To: Dr. Bass East: 10/7/2022 10:08 AM EDT  Subject: Labs    Can you please confirm that my lab orders are in my chart? I plan on going tomorrow morning to get them done. Please let me know!  Thanks!!

## 2022-10-08 ENCOUNTER — HOSPITAL ENCOUNTER (OUTPATIENT)
Age: 37
Discharge: HOME | End: 2022-10-08
Payer: COMMERCIAL

## 2022-10-08 DIAGNOSIS — Z00.00 ROUTINE GENERAL MEDICAL EXAMINATION AT A HEALTH CARE FACILITY: ICD-10-CM

## 2022-10-08 LAB
A/G RATIO: 1.6 (ref 1.1–2.2)
ALBUMIN SERPL-MCNC: 4.5 G/DL (ref 3.4–5)
ALP BLD-CCNC: 63 U/L (ref 40–129)
ALT SERPL-CCNC: 13 U/L (ref 10–40)
ANION GAP SERPL CALCULATED.3IONS-SCNC: 11 MMOL/L (ref 3–16)
AST SERPL-CCNC: 11 U/L (ref 15–37)
BASOPHILS ABSOLUTE: 0.1 K/UL (ref 0–0.2)
BASOPHILS RELATIVE PERCENT: 1 %
BILIRUB SERPL-MCNC: <0.2 MG/DL (ref 0–1)
BUN BLDV-MCNC: 15 MG/DL (ref 7–20)
CALCIUM SERPL-MCNC: 9.6 MG/DL (ref 8.3–10.6)
CHLORIDE BLD-SCNC: 101 MMOL/L (ref 99–110)
CHOLESTEROL, TOTAL: 221 MG/DL (ref 0–199)
CO2: 28 MMOL/L (ref 21–32)
CREAT SERPL-MCNC: 0.9 MG/DL (ref 0.6–1.1)
EOSINOPHILS ABSOLUTE: 0.1 K/UL (ref 0–0.6)
EOSINOPHILS RELATIVE PERCENT: 1.4 %
GFR AFRICAN AMERICAN: >60
GFR NON-AFRICAN AMERICAN: >60
GLUCOSE BLD-MCNC: 103 MG/DL (ref 70–99)
HCT VFR BLD CALC: 38.6 % (ref 36–48)
HDLC SERPL-MCNC: 32 MG/DL (ref 40–60)
HEMOGLOBIN: 12.8 G/DL (ref 12–16)
LDL CHOLESTEROL CALCULATED: 161 MG/DL
LYMPHOCYTES ABSOLUTE: 1.8 K/UL (ref 1–5.1)
LYMPHOCYTES RELATIVE PERCENT: 34.7 %
MCH RBC QN AUTO: 29.4 PG (ref 26–34)
MCHC RBC AUTO-ENTMCNC: 33.1 G/DL (ref 31–36)
MCV RBC AUTO: 89 FL (ref 80–100)
MONOCYTES ABSOLUTE: 0.4 K/UL (ref 0–1.3)
MONOCYTES RELATIVE PERCENT: 7.5 %
NEUTROPHILS ABSOLUTE: 2.8 K/UL (ref 1.7–7.7)
NEUTROPHILS RELATIVE PERCENT: 55.4 %
PDW BLD-RTO: 16.4 % (ref 12.4–15.4)
PLATELET # BLD: 305 K/UL (ref 135–450)
PMV BLD AUTO: 7.8 FL (ref 5–10.5)
POTASSIUM SERPL-SCNC: 4.6 MMOL/L (ref 3.5–5.1)
RBC # BLD: 4.33 M/UL (ref 4–5.2)
SODIUM BLD-SCNC: 140 MMOL/L (ref 136–145)
TOTAL PROTEIN: 7.3 G/DL (ref 6.4–8.2)
TRIGL SERPL-MCNC: 141 MG/DL (ref 0–150)
TSH REFLEX: 2.08 UIU/ML (ref 0.27–4.2)
URIC ACID, SERUM: 6.4 MG/DL (ref 2.6–6)
VLDLC SERPL CALC-MCNC: 28 MG/DL
WBC # BLD: 5.1 K/UL (ref 4–11)

## 2022-10-08 PROCEDURE — 80061 LIPID PANEL: CPT

## 2022-10-08 PROCEDURE — 36415 COLL VENOUS BLD VENIPUNCTURE: CPT

## 2022-10-08 PROCEDURE — 84443 ASSAY THYROID STIM HORMONE: CPT

## 2022-10-08 PROCEDURE — 84550 ASSAY OF BLOOD/URIC ACID: CPT

## 2022-10-08 PROCEDURE — 85025 COMPLETE CBC W/AUTO DIFF WBC: CPT

## 2022-10-08 PROCEDURE — 80053 COMPREHEN METABOLIC PANEL: CPT

## 2022-10-13 ENCOUNTER — OFFICE VISIT (OUTPATIENT)
Dept: INTERNAL MEDICINE CLINIC | Age: 37
End: 2022-10-13

## 2022-10-13 VITALS
SYSTOLIC BLOOD PRESSURE: 135 MMHG | BODY MASS INDEX: 38.41 KG/M2 | HEIGHT: 64 IN | WEIGHT: 225 LBS | DIASTOLIC BLOOD PRESSURE: 85 MMHG | HEART RATE: 70 BPM | RESPIRATION RATE: 18 BRPM

## 2022-10-13 DIAGNOSIS — E78.2 MIXED HYPERLIPIDEMIA: ICD-10-CM

## 2022-10-13 DIAGNOSIS — Z00.00 ANNUAL PHYSICAL EXAM: Primary | ICD-10-CM

## 2022-10-13 DIAGNOSIS — G47.33 OSA (OBSTRUCTIVE SLEEP APNEA): ICD-10-CM

## 2022-10-13 DIAGNOSIS — Z23 NEED FOR INFLUENZA VACCINATION: ICD-10-CM

## 2022-10-13 PROCEDURE — 99395 PREV VISIT EST AGE 18-39: CPT | Performed by: INTERNAL MEDICINE

## 2022-10-13 PROCEDURE — 90682 RIV4 VACC RECOMBINANT DNA IM: CPT | Performed by: INTERNAL MEDICINE

## 2022-10-13 PROCEDURE — 90471 IMMUNIZATION ADMIN: CPT | Performed by: INTERNAL MEDICINE

## 2022-10-13 ASSESSMENT — PATIENT HEALTH QUESTIONNAIRE - PHQ9
SUM OF ALL RESPONSES TO PHQ QUESTIONS 1-9: 0
1. LITTLE INTEREST OR PLEASURE IN DOING THINGS: 0
SUM OF ALL RESPONSES TO PHQ QUESTIONS 1-9: 0
SUM OF ALL RESPONSES TO PHQ9 QUESTIONS 1 & 2: 0
2. FEELING DOWN, DEPRESSED OR HOPELESS: 0

## 2022-10-13 ASSESSMENT — ENCOUNTER SYMPTOMS
CHEST TIGHTNESS: 0
SORE THROAT: 0
EYE PAIN: 0
STRIDOR: 0
COUGH: 0
SHORTNESS OF BREATH: 0
CONSTIPATION: 0
COLOR CHANGE: 0
ABDOMINAL PAIN: 0
DIARRHEA: 0
TROUBLE SWALLOWING: 0
PHOTOPHOBIA: 0
NAUSEA: 0

## 2022-10-13 NOTE — PROGRESS NOTES
Subjective:      Patient ID: Jose Rose is a 39 y.o. female. HPI      39 y.o. female here for annual exam       No medical issues except for obesity and MARITO. Since last time pt had MARITO screen and now using cpap regularly  Went to weight mx and ongoing diet and activity, lost about 26 lbs this year  Very happy about it and has more energy than before    , does not take any meds  No heart or lung issues      Recently had covid infection and recovered      and has 2 kids  Non smoker, no alcohol use    Mother with ovarian cancer, father with hx of DM     Past Medical History:   Diagnosis Date    COVID-19 10/16/2020        Past Surgical History:   Procedure Laterality Date    ABDOMINAL SURGERY      age 12 yrs    ADENOIDECTOMY      APPENDECTOMY       SECTION      x2    TONSILLECTOMY      age    [de-identified] TOOTH EXTRACTION Bilateral     five years ago     Allergies   Allergen Reactions    Ciprofloxacin Swelling    Bactrim [Sulfamethoxazole-Trimethoprim] Swelling and Rash    Sulfa Antibiotics Swelling and Rash       Review of Systems   Constitutional:  Negative for activity change, diaphoresis, fever and unexpected weight change. HENT:  Negative for congestion, ear discharge, hearing loss, nosebleeds, sore throat, tinnitus and trouble swallowing. Eyes:  Negative for photophobia, pain and visual disturbance. Respiratory:  Negative for cough, chest tightness, shortness of breath and stridor. Cardiovascular:  Negative for chest pain and palpitations. Gastrointestinal:  Negative for abdominal pain, constipation, diarrhea and nausea. Endocrine: Negative for cold intolerance, heat intolerance and polyuria. Genitourinary:  Negative for dysuria, flank pain, frequency and hematuria. Musculoskeletal:  Negative for arthralgias and gait problem. Skin:  Negative for color change and rash. Allergic/Immunologic: Negative for immunocompromised state.    Neurological:  Negative for tremors, seizures, weakness, numbness and headaches. Psychiatric/Behavioral:  Positive for sleep disturbance. Negative for decreased concentration. The patient is not nervous/anxious. All other systems reviewed and are negative. Objective:   Physical Exam     Vitals:    10/13/22 1552   BP: 135/85   Pulse: 70   Resp: 18         General: young female, obese,  Awake, alert and oriented. Appears to be not in any distress  Mucous Membranes:  Pink , anicteric  Short neck   Neck: No JVD, no carotid bruit, no thyromegaly  Chest:  Clear to auscultation bilaterally, no added sounds  Cardiovascular:  RRR S1S2 heard, no murmurs or gallops  Abdomen:  Soft, obese, undistended, non tender, no organomegaly, BS present  Extremities: No edema or cyanosis. Distal pulses well felt  Neurological : grossly normal  Non focal     Lab Results   Component Value Date    WBC 5.1 10/08/2022    HGB 12.8 10/08/2022    HCT 38.6 10/08/2022    MCV 89.0 10/08/2022     10/08/2022     Lab Results   Component Value Date     10/08/2022    K 4.6 10/08/2022     10/08/2022    CO2 28 10/08/2022    BUN 15 10/08/2022    CREATININE 0.9 10/08/2022    GLUCOSE 103 (H) 10/08/2022    CALCIUM 9.6 10/08/2022    PROT 7.3 10/08/2022    LABALBU 4.5 10/08/2022    BILITOT <0.2 10/08/2022    ALKPHOS 63 10/08/2022    AST 11 (L) 10/08/2022    ALT 13 10/08/2022    LABGLOM >60 10/08/2022    GFRAA >60 10/08/2022    AGRATIO 1.6 10/08/2022    GLOB 3.0 06/21/2021     Wt Readings from Last 3 Encounters:   10/13/22 225 lb (102.1 kg)   03/22/22 236 lb 11.2 oz (107.4 kg)   01/31/22 251 lb (113.9 kg)         Assessment:       Diagnosis Orders   1. Annual physical exam        2. Need for influenza vaccination  Influenza, FLUBLOK, (age 25 y+), IM, PF, 0.5 mL      3. MARITO (obstructive sleep apnea)        4.  Mixed hyperlipidemia                Plan:      Annual exam done     Elevated BP but no HTN, has h.o gestational HTN - improving with weight loss   should monitor at work  Low salt diet      MARITO  now on CPAP     Hyperlipidemia - ongoing diet control.  Hold off on meds    Obesity noted - weight loss ongoing with weight loss center        Recent labs reviewed  Healthy diet and weight loss discussed    Does see GYN yearly  Had flu vaccine  Had covid vaccine this year                Heena Beltrán MD

## 2023-02-09 ENCOUNTER — HOSPITAL ENCOUNTER (OUTPATIENT)
Dept: GENERAL RADIOLOGY | Age: 38
Discharge: HOME | End: 2023-02-09
Payer: COMMERCIAL

## 2023-02-09 ENCOUNTER — HOSPITAL ENCOUNTER (OUTPATIENT)
Age: 38
Discharge: HOME | End: 2023-02-09
Payer: COMMERCIAL

## 2023-02-09 ENCOUNTER — OFFICE VISIT (OUTPATIENT)
Dept: PULMONOLOGY | Age: 38
End: 2023-02-09
Payer: COMMERCIAL

## 2023-02-09 VITALS
SYSTOLIC BLOOD PRESSURE: 119 MMHG | WEIGHT: 231 LBS | HEIGHT: 64 IN | RESPIRATION RATE: 17 BRPM | DIASTOLIC BLOOD PRESSURE: 79 MMHG | BODY MASS INDEX: 39.44 KG/M2 | OXYGEN SATURATION: 98 % | HEART RATE: 96 BPM

## 2023-02-09 DIAGNOSIS — E66.01 MORBID OBESITY (HCC): ICD-10-CM

## 2023-02-09 DIAGNOSIS — G47.33 MODERATE OBSTRUCTIVE SLEEP APNEA: Primary | ICD-10-CM

## 2023-02-09 DIAGNOSIS — R93.89 ABNORMAL CXR: ICD-10-CM

## 2023-02-09 PROCEDURE — 99214 OFFICE O/P EST MOD 30 MIN: CPT | Performed by: INTERNAL MEDICINE

## 2023-02-09 PROCEDURE — 71046 X-RAY EXAM CHEST 2 VIEWS: CPT

## 2023-02-09 ASSESSMENT — SLEEP AND FATIGUE QUESTIONNAIRES
HOW LIKELY ARE YOU TO NOD OFF OR FALL ASLEEP WHILE SITTING QUIETLY AFTER LUNCH WITHOUT ALCOHOL: 0
HOW LIKELY ARE YOU TO NOD OFF OR FALL ASLEEP WHILE SITTING AND READING: 0
HOW LIKELY ARE YOU TO NOD OFF OR FALL ASLEEP IN A CAR, WHILE STOPPED FOR A FEW MINUTES IN TRAFFIC: 0
HOW LIKELY ARE YOU TO NOD OFF OR FALL ASLEEP WHILE SITTING INACTIVE IN A PUBLIC PLACE: 0
HOW LIKELY ARE YOU TO NOD OFF OR FALL ASLEEP WHEN YOU ARE A PASSENGER IN A CAR FOR AN HOUR WITHOUT A BREAK: 0
ESS TOTAL SCORE: 0
HOW LIKELY ARE YOU TO NOD OFF OR FALL ASLEEP WHILE WATCHING TV: 0
HOW LIKELY ARE YOU TO NOD OFF OR FALL ASLEEP WHILE LYING DOWN TO REST IN THE AFTERNOON WHEN CIRCUMSTANCES PERMIT: 0
HOW LIKELY ARE YOU TO NOD OFF OR FALL ASLEEP WHILE SITTING AND TALKING TO SOMEONE: 0
NECK CIRCUMFERENCE (INCHES): 16

## 2023-02-09 NOTE — PROGRESS NOTES
[facility]                                                                  CHIEF COMPLAINT: follow up MARITO        HPI:   Doing well with her CPAP. Patient is using CPAP 6-7  hrs/night. Using humidifier. No snoring on CPAP. The pressure is well tolerated. The mask is comfortable. No mask leak. No significant daytime sleepiness. No nodding off when driving. Bedtime is 10-11 pm and rise time is 7 am. Sleep onset is 10-15 minutes. ESS is 0   Has not had her CXR   No SOB cough or wheezes             Past Medical History:   Diagnosis Date    COVID-19 10/16/2020       Past Surgical History:        Procedure Laterality Date    ABDOMEN SURGERY      age 12 yrs    ADENOIDECTOMY      APPENDECTOMY       SECTION      x2    TONSILLECTOMY      age    [de-identified] TOOTH EXTRACTION Bilateral     five years ago       Allergies:  is allergic to ciprofloxacin, bactrim [sulfamethoxazole-trimethoprim], and sulfa antibiotics. Social History:    TOBACCO:   reports that she has never smoked. She has never used smokeless tobacco.  ETOH:   reports current alcohol use. Family History:       Problem Relation Age of Onset    Diabetes Mother     Hypertension Mother     Elevated Lipids Mother     Migraines Mother     Kidney Disease Mother     Diabetes Father     Hypertension Father     Elevated Lipids Father     Migraines Father     Depression Father     Hypertension Maternal Grandmother     Elevated Lipids Maternal Grandmother     Arthritis Maternal Grandmother     Cancer Maternal Grandfather     Hypertension Maternal Grandfather     Elevated Lipids Maternal Grandfather     Cancer Paternal Grandmother     Cancer Paternal Grandfather     Cancer Sister        Current Medications:  No current outpatient medications on file. Objective:   PHYSICAL EXAM:    Blood pressure 119/79, pulse 96, resp.  rate 17, height 5' 4\" (1.626 m), weight 231 lb (104.8 kg), SpO2 98 %, not currently breastfeeding.' on RA  Gen: No distress. Obese. BMI of 39.65  Eyes: PERRL. No sclera icterus. No conjunctival injection. ENT: No discharge. Pharynx clear. Mallampati class IV. Neck: Trachea midline. No obvious mass. Neck circumference 15.5\"  Resp: No accessory muscle use. No crackles. No wheezes. No rhonchi. No dullness on percussion. Good air entry. CV: Regular rate. Regular rhythm. No murmur or rub. No edema. GI: Non-tender. Non-distended. No hernia. Skin: Warm and dry. No nodule on exposed extremities. Lymph: No cervical LAD. No supraclavicular LAD. M/S: No cyanosis. No joint deformity. No clubbing. Neuro: Awake. Alert. Moves all four extremities. Psych: Oriented x 3. No anxiety. DATA reviewed by me:   Chest x-ray 1/6/2021   Multifocal airspace disease    HST 7/28/21 AHI 15.2     CPAP data 11/29-12/28 2021 reviewed by me. Uses 7-8  hrs/night with 93% compliance and AHI of  0.3. Median pressure of 8.9 cmH2O, 95th pressure of  10.9 cmH2O. APAP 8-16 cmH2O   CPAP data 01/10-02/08 2023 reviewed by me. Uses 6-7  hrs/night with 90% compliance and AHI of  0.6 Median pressure of 8.6 cmH2O, 95th pressure of  10.6 cmH2O. APAP 7-11 cmH2O     Assessment:       Moderate MARITO. APAP 7-11 cmH2O. Nasal pillow. Optimal compliance and efficacy upon review today. Morbid obesity class III  Abnormal chest x-ray Jan 2021 - treated for PNA   Life long nonsmoker       Plan:    Order for CPAP supplies  Continue CPAP 6-8 hrs at night and during naps. Replacement of mask, tubing, head straps every 3-6 months or sooner if damaged. Follow up CPAP compliance and pressure adjustment if needed  Sleep hygiene  Avoid sedatives, alcohol and caffeinated drinks at bed time. No driving motorized vehicles or operating heavy machinery while fatigue, drowsy or sleepy. Weight loss is also recommended as a long-term intervention.     CXR PA and lateral to document resolution of abnormalities  Follow up in 1 year or sooner if needed

## 2023-08-14 ENCOUNTER — TELEPHONE (OUTPATIENT)
Dept: INTERNAL MEDICINE CLINIC | Age: 38
End: 2023-08-14

## 2023-08-14 RX ORDER — ONDANSETRON 4 MG/1
4 TABLET, ORALLY DISINTEGRATING ORAL EVERY 8 HOURS PRN
Qty: 20 TABLET | Refills: 0 | Status: SHIPPED | OUTPATIENT
Start: 2023-08-14

## 2023-08-14 NOTE — TELEPHONE ENCOUNTER
----- Message from Eber Feliz sent at 8/14/2023  3:32 PM EDT -----  No openings with Makayla Spillers until 8/23/23. Please advise.  ----- Message -----  From: Ioana Painter MD  Sent: 8/14/2023   3:18 PM EDT  To: Eber Feliz    Make appt with Lucia Rodriguez zofran 4 mg ODT q8 hrly #20    ----- Message -----  From: Eber Feliz  Sent: 8/14/2023   2:12 PM EDT  To: Ioana Painter MD    My kids both had a stomach bug on Friday. I was the one taking care of them and since last night I have not been feeling well. I'm nauseous, achy, congested, sore throat, headache and low grade fever (99.7 highest it has been)   I don't know what is wrong with me but I'm wondering if I should be seen? Tested neg for Covid. Please advise.

## 2023-08-15 ENCOUNTER — TELEPHONE (OUTPATIENT)
Dept: INTERNAL MEDICINE CLINIC | Age: 38
End: 2023-08-15

## 2023-08-15 NOTE — TELEPHONE ENCOUNTER
----- Message from Franc Amin MD sent at 8/15/2023 11:41 AM EDT -----  Try Zofran and fluids. If not better urgent care  ----- Message -----  From: Wilma Estes  Sent: 8/14/2023   5:02 PM EDT  To: Franc Amin MD, #    Dr. Julia Mercado pt- are you able to see pt?  ----- Message -----  From: Wilma Estes  Sent: 8/14/2023   3:32 PM EDT  To: Diann Porras MD    No openings with Ryan Ritter until 8/23/23. Please advise.  ----- Message -----  From: Diann Porras MD  Sent: 8/14/2023   3:18 PM EDT  To: Wilma Estes    Make appt with Gapland Ivory zofran 4 mg ODT q8 hrly #20    ----- Message -----  From: Wilma Estes  Sent: 8/14/2023   2:12 PM EDT  To: Diann Porras MD    My kids both had a stomach bug on Friday. I was the one taking care of them and since last night I have not been feeling well. I'm nauseous, achy, congested, sore throat, headache and low grade fever (99.7 highest it has been)   I don't know what is wrong with me but I'm wondering if I should be seen? Tested neg for Covid. Please advise.

## 2024-02-08 ENCOUNTER — OFFICE VISIT (OUTPATIENT)
Dept: PULMONOLOGY | Age: 39
End: 2024-02-08
Payer: COMMERCIAL

## 2024-02-08 VITALS
SYSTOLIC BLOOD PRESSURE: 118 MMHG | HEART RATE: 91 BPM | OXYGEN SATURATION: 97 % | BODY MASS INDEX: 40.9 KG/M2 | WEIGHT: 239.6 LBS | DIASTOLIC BLOOD PRESSURE: 80 MMHG | HEIGHT: 64 IN

## 2024-02-08 DIAGNOSIS — G47.33 MODERATE OBSTRUCTIVE SLEEP APNEA: Primary | ICD-10-CM

## 2024-02-08 DIAGNOSIS — R93.89 ABNORMAL CXR: ICD-10-CM

## 2024-02-08 PROCEDURE — 99214 OFFICE O/P EST MOD 30 MIN: CPT | Performed by: INTERNAL MEDICINE

## 2024-02-08 ASSESSMENT — SLEEP AND FATIGUE QUESTIONNAIRES
ESS TOTAL SCORE: 1
HOW LIKELY ARE YOU TO NOD OFF OR FALL ASLEEP WHILE SITTING QUIETLY AFTER LUNCH WITHOUT ALCOHOL: 0
HOW LIKELY ARE YOU TO NOD OFF OR FALL ASLEEP WHILE SITTING AND READING: 0
HOW LIKELY ARE YOU TO NOD OFF OR FALL ASLEEP WHILE SITTING INACTIVE IN A PUBLIC PLACE: 0
HOW LIKELY ARE YOU TO NOD OFF OR FALL ASLEEP WHEN YOU ARE A PASSENGER IN A CAR FOR AN HOUR WITHOUT A BREAK: 0
HOW LIKELY ARE YOU TO NOD OFF OR FALL ASLEEP IN A CAR, WHILE STOPPED FOR A FEW MINUTES IN TRAFFIC: 0
HOW LIKELY ARE YOU TO NOD OFF OR FALL ASLEEP WHILE WATCHING TV: 1
HOW LIKELY ARE YOU TO NOD OFF OR FALL ASLEEP WHILE LYING DOWN TO REST IN THE AFTERNOON WHEN CIRCUMSTANCES PERMIT: 0
HOW LIKELY ARE YOU TO NOD OFF OR FALL ASLEEP WHILE SITTING AND TALKING TO SOMEONE: 0

## 2024-02-08 NOTE — PROGRESS NOTES
[facility]                                                                  CHIEF COMPLAINT: follow up MARITO        HPI:   Doing well with CPAP. Patient is using CPAP 6-7  hrs/night. Using humidifier. No snoring on CPAP. The pressure is well tolerated. The mask is comfortable. No mask leak. No significant daytime sleepiness. No nodding off when driving. Bedtime is 10 pm and rise time is 6:30-7 am. Sleep onset is 15-20 minutes.ESS is 1   No SOB cough or wheezes             Past Medical History:   Diagnosis Date    COVID-19 10/16/2020       Past Surgical History:        Procedure Laterality Date    ABDOMEN SURGERY      age 16 yrs    ADENOIDECTOMY      APPENDECTOMY       SECTION      x2    TONSILLECTOMY      age    WISDOM TOOTH EXTRACTION Bilateral     five years ago       Allergies:  is allergic to ciprofloxacin, bactrim [sulfamethoxazole-trimethoprim], and sulfa antibiotics.  Social History:    TOBACCO:   reports that she has never smoked. She has never used smokeless tobacco.  ETOH:   reports current alcohol use.      Family History:       Problem Relation Age of Onset    Diabetes Mother     Hypertension Mother     Elevated Lipids Mother     Migraines Mother     Kidney Disease Mother     Diabetes Father     Hypertension Father     Elevated Lipids Father     Migraines Father     Depression Father     Hypertension Maternal Grandmother     Elevated Lipids Maternal Grandmother     Arthritis Maternal Grandmother     Cancer Maternal Grandfather     Hypertension Maternal Grandfather     Elevated Lipids Maternal Grandfather     Cancer Paternal Grandmother     Cancer Paternal Grandfather     Cancer Sister        Current Medications:    Current Outpatient Medications:     ondansetron (ZOFRAN-ODT) 4 MG disintegrating tablet, Take 1 tablet by mouth every 8 hours as needed for Nausea or Vomiting, Disp: 20 tablet, Rfl: 0          Objective:

## 2024-02-08 NOTE — PATIENT INSTRUCTIONS
your CPAP   equipment.  - Do follow the recommended cleaning schedule.    - Do change your disposable filter frequently.     Adapted From: http://www.cpapstation.com/cleaning.shtm.  These are general suggestions for all models please follow specific ’s recommendations and specific instructions

## 2024-02-09 ENCOUNTER — TELEPHONE (OUTPATIENT)
Dept: PULMONOLOGY | Age: 39
End: 2024-02-09

## 2024-02-09 NOTE — TELEPHONE ENCOUNTER
Faxed nasal mask order, full face mask order, nasal pillows mask order, and ov notes to [facility] at [phone] via [employer].

## 2024-03-18 ENCOUNTER — HOSPITAL ENCOUNTER (OUTPATIENT)
Age: 39
Discharge: HOME | End: 2024-03-18
Payer: COMMERCIAL

## 2024-03-18 ENCOUNTER — HOSPITAL ENCOUNTER (OUTPATIENT)
Dept: GENERAL RADIOLOGY | Age: 39
Discharge: HOME | End: 2024-03-18
Payer: COMMERCIAL

## 2024-03-18 ENCOUNTER — TELEPHONE (OUTPATIENT)
Dept: INTERNAL MEDICINE CLINIC | Age: 39
End: 2024-03-18

## 2024-03-18 DIAGNOSIS — J18.9 PNEUMONIA SYMPTOMS: Primary | ICD-10-CM

## 2024-03-18 DIAGNOSIS — J18.9 PNEUMONIA SYMPTOMS: ICD-10-CM

## 2024-03-18 PROCEDURE — 71046 X-RAY EXAM CHEST 2 VIEWS: CPT

## 2024-03-18 RX ORDER — BENZONATATE 100 MG/1
100 CAPSULE ORAL 2 TIMES DAILY PRN
Qty: 14 CAPSULE | Refills: 0 | Status: SHIPPED | OUTPATIENT
Start: 2024-03-18 | End: 2024-03-25

## 2024-03-18 RX ORDER — AZITHROMYCIN 250 MG/1
TABLET, FILM COATED ORAL
Qty: 6 TABLET | Refills: 0 | Status: SHIPPED | OUTPATIENT
Start: 2024-03-18

## 2024-03-18 NOTE — TELEPHONE ENCOUNTER
----- Message from Izabella Barlow sent at 3/18/2024  1:21 PM EDT -----    ----- Message -----  From: Lino Ferguson MD  Sent: 3/18/2024   1:21 PM EDT  To: Izabella Barlow    Cxr 2 view - cough    Start on z pack if no allergies  Also try mucinex   See us if not better  Tessalon pearls 100 mg tid x 7 days    ----- Message -----  From: Izabella Barlow  Sent: 3/18/2024   8:12 AM EDT  To: Lino Ferguson MD    I've had a pretty wicked cough for a few days and last night I developed a fever. I tested for Covid and was negative. I think I might have pneumonia. Can you order a chest x-ray??

## 2024-03-19 ENCOUNTER — TELEPHONE (OUTPATIENT)
Dept: INTERNAL MEDICINE CLINIC | Age: 39
End: 2024-03-19

## 2024-03-19 DIAGNOSIS — J18.9 LINGULAR PNEUMONIA: Primary | ICD-10-CM

## 2024-03-19 RX ORDER — CEFUROXIME AXETIL 500 MG/1
500 TABLET ORAL 2 TIMES DAILY
Qty: 14 TABLET | Refills: 0 | Status: SHIPPED | OUTPATIENT
Start: 2024-03-19 | End: 2024-03-26

## 2024-03-19 NOTE — TELEPHONE ENCOUNTER
----- Message from Lino Ferguson MD sent at 3/19/2024  7:29 AM EDT -----  Left sided bottom pneumonia  Continue z pack , add ceftin 500 mg bid x7 days  Repeat chest xray  - 2 view in 4 weeks to document resolution   Ordered

## 2025-02-06 ENCOUNTER — OFFICE VISIT (OUTPATIENT)
Dept: PULMONOLOGY | Age: 40
End: 2025-02-06
Payer: COMMERCIAL

## 2025-02-06 VITALS
HEIGHT: 64 IN | SYSTOLIC BLOOD PRESSURE: 116 MMHG | DIASTOLIC BLOOD PRESSURE: 68 MMHG | OXYGEN SATURATION: 97 % | WEIGHT: 237.8 LBS | RESPIRATION RATE: 18 BRPM | HEART RATE: 90 BPM | BODY MASS INDEX: 40.6 KG/M2

## 2025-02-06 DIAGNOSIS — E66.01 MORBID OBESITY: ICD-10-CM

## 2025-02-06 DIAGNOSIS — G47.33 MODERATE OBSTRUCTIVE SLEEP APNEA: Primary | ICD-10-CM

## 2025-02-06 PROCEDURE — 99214 OFFICE O/P EST MOD 30 MIN: CPT | Performed by: INTERNAL MEDICINE

## 2025-02-06 ASSESSMENT — SLEEP AND FATIGUE QUESTIONNAIRES
HOW LIKELY ARE YOU TO NOD OFF OR FALL ASLEEP WHILE SITTING INACTIVE IN A PUBLIC PLACE: WOULD NEVER DOZE
HOW LIKELY ARE YOU TO NOD OFF OR FALL ASLEEP WHILE SITTING AND READING: WOULD NEVER DOZE
HOW LIKELY ARE YOU TO NOD OFF OR FALL ASLEEP WHILE LYING DOWN TO REST IN THE AFTERNOON WHEN CIRCUMSTANCES PERMIT: WOULD NEVER DOZE
HOW LIKELY ARE YOU TO NOD OFF OR FALL ASLEEP WHILE SITTING QUIETLY AFTER LUNCH WITHOUT ALCOHOL: WOULD NEVER DOZE
HOW LIKELY ARE YOU TO NOD OFF OR FALL ASLEEP IN A CAR, WHILE STOPPED FOR A FEW MINUTES IN TRAFFIC: WOULD NEVER DOZE
HOW LIKELY ARE YOU TO NOD OFF OR FALL ASLEEP WHILE SITTING AND TALKING TO SOMEONE: WOULD NEVER DOZE
ESS TOTAL SCORE: 0
HOW LIKELY ARE YOU TO NOD OFF OR FALL ASLEEP WHILE WATCHING TV: WOULD NEVER DOZE
HOW LIKELY ARE YOU TO NOD OFF OR FALL ASLEEP WHEN YOU ARE A PASSENGER IN A CAR FOR AN HOUR WITHOUT A BREAK: WOULD NEVER DOZE

## 2025-02-06 NOTE — PROGRESS NOTES
[facility]                                                                  CHIEF COMPLAINT: follow up MARITO        HPI:   Doing well with CPAP. Patient is using CPAP 7-8 hrs/night. Using humidifier. No snoring on CPAP. The pressure is well tolerated. The mask is comfortable. No mask leak. No significant daytime sleepiness. No nodding off when driving. Bedtime is 10 pm and rise time is 6- 6:30 am. Sleep onset is <20 minutes.ESS is 0  No SOB cough or wheezes             Past Medical History:   Diagnosis Date    COVID-19 10/16/2020       Past Surgical History:        Procedure Laterality Date    ABDOMEN SURGERY      age 16 yrs    ADENOIDECTOMY      APPENDECTOMY       SECTION      x2    TONSILLECTOMY      age    WISDOM TOOTH EXTRACTION Bilateral     five years ago       Allergies:  is allergic to ciprofloxacin, bactrim [sulfamethoxazole-trimethoprim], and sulfa antibiotics.  Social History:    TOBACCO:   reports that she has never smoked. She has never used smokeless tobacco.  ETOH:   reports current alcohol use.      Family History:       Problem Relation Age of Onset    Diabetes Mother     Hypertension Mother     Elevated Lipids Mother     Migraines Mother     Kidney Disease Mother     Diabetes Father     Hypertension Father     Elevated Lipids Father     Migraines Father     Depression Father     Hypertension Maternal Grandmother     Elevated Lipids Maternal Grandmother     Arthritis Maternal Grandmother     Cancer Maternal Grandfather     Hypertension Maternal Grandfather     Elevated Lipids Maternal Grandfather     Cancer Paternal Grandmother     Cancer Paternal Grandfather     Cancer Sister        Current Medications:    Current Outpatient Medications:     azithromycin (ZITHROMAX) 250 MG tablet, Take 2 tabs (500 MG) on Day 1, and take 1 tab (250 MG) on days 2-5., Disp: 6 tablet, Rfl: 0    ondansetron (ZOFRAN-ODT) 4 MG disintegrating

## 2025-04-04 ENCOUNTER — TELEPHONE (OUTPATIENT)
Dept: INTERNAL MEDICINE CLINIC | Age: 40
End: 2025-04-04

## 2025-04-04 DIAGNOSIS — Z00.00 ANNUAL PHYSICAL EXAM: Primary | ICD-10-CM

## 2025-04-04 NOTE — TELEPHONE ENCOUNTER
----- Message from Dr. Lino Ferguson MD sent at 4/4/2025 12:36 PM EDT -----  Cbc cmp, lipids, A1c, tsh, uric acid, uA  Annual exam  ----- Message -----  From: Polly Guillen MA  Sent: 4/4/2025  10:30 AM EDT  To: Lino Ferguson MD    I have a yearly appointment on April 17th. Would Dr. VILLA like me to get lab work done head of time to discuss at my appointment? If so, could he put the orders in and I'll get them done before my appointment.   Thank you.   Sara Roldan

## 2025-04-05 ENCOUNTER — HOSPITAL ENCOUNTER (OUTPATIENT)
Age: 40
Discharge: HOME | End: 2025-04-05
Payer: COMMERCIAL

## 2025-04-05 DIAGNOSIS — Z00.00 ANNUAL PHYSICAL EXAM: ICD-10-CM

## 2025-04-05 LAB
ALBUMIN SERPL-MCNC: 4.2 G/DL (ref 3.4–5)
ALBUMIN/GLOB SERPL: 1.4 {RATIO} (ref 1.1–2.2)
ALP SERPL-CCNC: 71 U/L (ref 40–129)
ALT SERPL-CCNC: 15 U/L (ref 10–40)
ANION GAP SERPL CALCULATED.3IONS-SCNC: 11 MMOL/L (ref 3–16)
AST SERPL-CCNC: 18 U/L (ref 15–37)
BASOPHILS # BLD: 0 K/UL (ref 0–0.2)
BASOPHILS NFR BLD: 0.8 %
BILIRUB SERPL-MCNC: <0.2 MG/DL (ref 0–1)
BUN SERPL-MCNC: 11 MG/DL (ref 7–20)
CALCIUM SERPL-MCNC: 9.3 MG/DL (ref 8.3–10.6)
CHLORIDE SERPL-SCNC: 105 MMOL/L (ref 99–110)
CHOLEST SERPL-MCNC: 190 MG/DL (ref 0–199)
CO2 SERPL-SCNC: 24 MMOL/L (ref 21–32)
CREAT SERPL-MCNC: 0.7 MG/DL (ref 0.6–1.1)
DEPRECATED RDW RBC AUTO: 17.2 % (ref 12.4–15.4)
EOSINOPHIL # BLD: 0.1 K/UL (ref 0–0.6)
EOSINOPHIL NFR BLD: 1.3 %
GFR SERPLBLD CREATININE-BSD FMLA CKD-EPI: >90 ML/MIN/{1.73_M2}
GLUCOSE SERPL-MCNC: 101 MG/DL (ref 70–99)
HCT VFR BLD AUTO: 36.9 % (ref 36–48)
HDLC SERPL-MCNC: 28 MG/DL (ref 40–60)
HGB BLD-MCNC: 12.2 G/DL (ref 12–16)
LDLC SERPL CALC-MCNC: 138 MG/DL
LYMPHOCYTES # BLD: 1.8 K/UL (ref 1–5.1)
LYMPHOCYTES NFR BLD: 32.1 %
MCH RBC QN AUTO: 28.9 PG (ref 26–34)
MCHC RBC AUTO-ENTMCNC: 33.2 G/DL (ref 31–36)
MCV RBC AUTO: 87.1 FL (ref 80–100)
MONOCYTES # BLD: 0.5 K/UL (ref 0–1.3)
MONOCYTES NFR BLD: 8.8 %
NEUTROPHILS # BLD: 3.1 K/UL (ref 1.7–7.7)
NEUTROPHILS NFR BLD: 57 %
PLATELET # BLD AUTO: 334 K/UL (ref 135–450)
PMV BLD AUTO: 8.1 FL (ref 5–10.5)
POTASSIUM SERPL-SCNC: 4.2 MMOL/L (ref 3.5–5.1)
PROT SERPL-MCNC: 7.2 G/DL (ref 6.4–8.2)
RBC # BLD AUTO: 4.24 M/UL (ref 4–5.2)
SODIUM SERPL-SCNC: 140 MMOL/L (ref 136–145)
TRIGL SERPL-MCNC: 121 MG/DL (ref 0–150)
TSH SERPL DL<=0.005 MIU/L-ACNC: 2.12 UIU/ML (ref 0.27–4.2)
URATE SERPL-MCNC: 5.2 MG/DL (ref 2.6–6)
VLDLC SERPL CALC-MCNC: 24 MG/DL
WBC # BLD AUTO: 5.5 K/UL (ref 4–11)

## 2025-04-05 PROCEDURE — 80061 LIPID PANEL: CPT

## 2025-04-05 PROCEDURE — 84550 ASSAY OF BLOOD/URIC ACID: CPT

## 2025-04-05 PROCEDURE — 80053 COMPREHEN METABOLIC PANEL: CPT

## 2025-04-05 PROCEDURE — 85025 COMPLETE CBC W/AUTO DIFF WBC: CPT

## 2025-04-05 PROCEDURE — 84443 ASSAY THYROID STIM HORMONE: CPT

## 2025-04-05 PROCEDURE — 83036 HEMOGLOBIN GLYCOSYLATED A1C: CPT

## 2025-04-05 PROCEDURE — 36415 COLL VENOUS BLD VENIPUNCTURE: CPT

## 2025-04-06 LAB
EST. AVERAGE GLUCOSE BLD GHB EST-MCNC: 93.9 MG/DL
HBA1C MFR BLD: 4.9 %

## 2025-04-07 ENCOUNTER — RESULTS FOLLOW-UP (OUTPATIENT)
Dept: INTERNAL MEDICINE CLINIC | Age: 40
End: 2025-04-07

## 2025-04-16 ASSESSMENT — PATIENT HEALTH QUESTIONNAIRE - PHQ9
SUM OF ALL RESPONSES TO PHQ QUESTIONS 1-9: 0
2. FEELING DOWN, DEPRESSED OR HOPELESS: NOT AT ALL
SUM OF ALL RESPONSES TO PHQ QUESTIONS 1-9: 0
1. LITTLE INTEREST OR PLEASURE IN DOING THINGS: NOT AT ALL
SUM OF ALL RESPONSES TO PHQ9 QUESTIONS 1 & 2: 0
SUM OF ALL RESPONSES TO PHQ QUESTIONS 1-9: 0
2. FEELING DOWN, DEPRESSED OR HOPELESS: NOT AT ALL
1. LITTLE INTEREST OR PLEASURE IN DOING THINGS: NOT AT ALL
SUM OF ALL RESPONSES TO PHQ QUESTIONS 1-9: 0

## 2025-04-17 ENCOUNTER — OFFICE VISIT (OUTPATIENT)
Dept: INTERNAL MEDICINE CLINIC | Age: 40
End: 2025-04-17

## 2025-04-17 VITALS
HEIGHT: 64 IN | RESPIRATION RATE: 18 BRPM | HEART RATE: 65 BPM | SYSTOLIC BLOOD PRESSURE: 135 MMHG | WEIGHT: 235 LBS | DIASTOLIC BLOOD PRESSURE: 78 MMHG | BODY MASS INDEX: 40.12 KG/M2

## 2025-04-17 DIAGNOSIS — Z00.00 ANNUAL PHYSICAL EXAM: Primary | ICD-10-CM

## 2025-04-17 DIAGNOSIS — G47.33 OSA (OBSTRUCTIVE SLEEP APNEA): ICD-10-CM

## 2025-04-17 PROCEDURE — 99395 PREV VISIT EST AGE 18-39: CPT | Performed by: INTERNAL MEDICINE

## 2025-04-17 ASSESSMENT — ENCOUNTER SYMPTOMS
CHEST TIGHTNESS: 0
COUGH: 0
ABDOMINAL PAIN: 0
STRIDOR: 0
TROUBLE SWALLOWING: 0
EYE PAIN: 0
DIARRHEA: 0
PHOTOPHOBIA: 0
NAUSEA: 0
SORE THROAT: 0
SHORTNESS OF BREATH: 0
COLOR CHANGE: 0
CONSTIPATION: 0

## 2025-04-17 ASSESSMENT — PATIENT HEALTH QUESTIONNAIRE - PHQ9
1. LITTLE INTEREST OR PLEASURE IN DOING THINGS: NOT AT ALL
SUM OF ALL RESPONSES TO PHQ QUESTIONS 1-9: 0
SUM OF ALL RESPONSES TO PHQ QUESTIONS 1-9: 0
2. FEELING DOWN, DEPRESSED OR HOPELESS: NOT AT ALL
SUM OF ALL RESPONSES TO PHQ QUESTIONS 1-9: 0
SUM OF ALL RESPONSES TO PHQ QUESTIONS 1-9: 0

## 2025-04-17 NOTE — PROGRESS NOTES
seizures, weakness, numbness and headaches.   Psychiatric/Behavioral:  Positive for sleep disturbance. Negative for decreased concentration. The patient is not nervous/anxious.    All other systems reviewed and are negative.      Objective:   Physical Exam     Vitals:    04/17/25 1406   BP: 135/78   Pulse: 65   Resp: 18           General: young female, obese,  Awake, alert and oriented. Appears to be not in any distress  Mucous Membranes:  Pink , anicteric  Short neck   Neck: No JVD, no carotid bruit, no thyromegaly  Chest:  Clear to auscultation bilaterally, no added sounds  Cardiovascular:  RRR S1S2 heard, no murmurs or gallops  Abdomen:  Soft, obese, undistended, non tender, no organomegaly, BS present  Extremities: No edema or cyanosis. Distal pulses well felt  Neurological : grossly normal  Non focal     Lab Results   Component Value Date    WBC 5.5 04/05/2025    HGB 12.2 04/05/2025    HCT 36.9 04/05/2025    MCV 87.1 04/05/2025     04/05/2025     Lab Results   Component Value Date     04/05/2025    K 4.2 04/05/2025     04/05/2025    CO2 24 04/05/2025    BUN 11 04/05/2025    CREATININE 0.7 04/05/2025    GLUCOSE 101 (H) 04/05/2025    CALCIUM 9.3 04/05/2025    BILITOT <0.2 04/05/2025    ALKPHOS 71 04/05/2025    AST 18 04/05/2025    ALT 15 04/05/2025    LABGLOM >90 04/05/2025    GFRAA >60 10/08/2022    AGRATIO 1.4 04/05/2025    GLOB 3.0 06/21/2021     Wt Readings from Last 3 Encounters:   04/17/25 106.6 kg (235 lb)   02/06/25 107.9 kg (237 lb 12.8 oz)   02/08/24 108.7 kg (239 lb 9.6 oz)         Assessment:       Diagnosis Orders   1. Annual physical exam        2. MARITO (obstructive sleep apnea)                  Plan:   Assessment & Plan   Annual exam done     Elevated BP but no HTN, has h.o gestational HTN - improving with weight loss   should monitor at work  Low salt diet      MARITO  now on CPAP - very complaint    Hyperlipidemia - ongoing diet control and improving Hold off on meds    Obesity

## 2025-04-18 ENCOUNTER — RESULTS FOLLOW-UP (OUTPATIENT)
Dept: INTERNAL MEDICINE CLINIC | Age: 40
End: 2025-04-18

## 2025-04-18 LAB
BACTERIA URNS QL MICRO: ABNORMAL /HPF
BILIRUB UR QL STRIP.AUTO: NEGATIVE
CLARITY UR: CLEAR
COLOR UR: YELLOW
EPI CELLS #/AREA URNS AUTO: 3 /HPF (ref 0–5)
GLUCOSE UR STRIP.AUTO-MCNC: NEGATIVE MG/DL
HGB UR QL STRIP.AUTO: NEGATIVE
HYALINE CASTS #/AREA URNS AUTO: 0 /LPF (ref 0–8)
KETONES UR STRIP.AUTO-MCNC: NEGATIVE MG/DL
LEUKOCYTE ESTERASE UR QL STRIP.AUTO: ABNORMAL
NITRITE UR QL STRIP.AUTO: NEGATIVE
PH UR STRIP.AUTO: 6.5 [PH] (ref 5–8)
PROT UR STRIP.AUTO-MCNC: NEGATIVE MG/DL
RBC CLUMPS #/AREA URNS AUTO: 0 /HPF (ref 0–4)
SP GR UR STRIP.AUTO: 1 (ref 1–1.03)
UA DIPSTICK W REFLEX MICRO PNL UR: YES
URN SPEC COLLECT METH UR: ABNORMAL
UROBILINOGEN UR STRIP-ACNC: 0.2 E.U./DL
WBC #/AREA URNS AUTO: 9 /HPF (ref 0–5)